# Patient Record
Sex: FEMALE | Race: WHITE | NOT HISPANIC OR LATINO | Employment: UNEMPLOYED | URBAN - NONMETROPOLITAN AREA
[De-identification: names, ages, dates, MRNs, and addresses within clinical notes are randomized per-mention and may not be internally consistent; named-entity substitution may affect disease eponyms.]

---

## 2020-10-28 NOTE — PROGRESS NOTES
CM met with the patient to discuss discharge planning, patient sitting in the recliner. Patient stated that she feels that she is making progress and would like to go home soon. CM discussed a tentative discharge Wednesday 7/29, patient verbalized that would be a reasonable plan. CM spoke with the patient regarding [facility] 80 (PT/OT), patient stated that she is not sure if she feels that is needed but will discuss it with her  who will be visiting her later this evening. CM will follow-up with the patient tomorrow to finalize plans. Patient: Enedina Yeboah    YOB: 1970    Date: 10/29/2020    Primary Care Provider: Yue Mario PA    Chief Complaint   Patient presents with   • Mass     mass x2 on the right hip       SUBJECTIVE:    History of present illness:  The patient is in the office for evaluation and treatment of 2 masses on her right hip.  She states that they are causing pain when she walks or performed normal daily routines.  Patient also has significant arthritis in her right hip, not likely to have the lipoma causing the pain in the hip.  Lipoma present for many years, no significant change in size.  She feels 2 lesion on the right lateral hip.    The following portions of the patient's history were reviewed and updated as appropriate: allergies, current medications, past family history, past medical history, past social history, past surgical history and problem list.      Review of Systems   Constitutional: Negative for chills, fever and unexpected weight change.   HENT: Negative for hearing loss, trouble swallowing and voice change.    Eyes: Negative for visual disturbance.   Respiratory: Negative for apnea, cough, chest tightness, shortness of breath and wheezing.    Cardiovascular: Negative for chest pain, palpitations and leg swelling.   Gastrointestinal: Negative for abdominal distention, abdominal pain, anal bleeding, blood in stool, constipation, diarrhea, nausea, rectal pain and vomiting.   Endocrine: Negative for cold intolerance and heat intolerance.   Genitourinary: Negative for difficulty urinating, dysuria and flank pain.   Musculoskeletal: Negative for back pain and gait problem.   Skin: Negative for color change, rash and wound.   Neurological: Negative for dizziness, syncope, speech difficulty, weakness, light-headedness, numbness and headaches.   Hematological: Negative for adenopathy. Does not bruise/bleed easily.   Psychiatric/Behavioral: Negative for confusion. The patient is not  "nervous/anxious.        Allergies:  Allergies   Allergen Reactions   • Sumatriptan Headache       Medications:    Current Outpatient Medications:   •  acetaminophen (TYLENOL) 325 MG tablet, , Disp: , Rfl:   •  Adalimumab (Humira Pen) 40 MG/0.8ML Pen-injector Kit, Humira Pen 40 mg/0.8 mL subcutaneous kit, Disp: , Rfl:   •  citalopram (CeleXA) 10 MG tablet, , Disp: , Rfl:   •  Cosentyx Sensoready, 300 MG, 150 MG/ML solution auto-injector, , Disp: , Rfl:   •  folic acid (FOLVITE) 1 MG tablet, folic acid 1 mg tablet, Disp: , Rfl:   •  levothyroxine (SYNTHROID, LEVOTHROID) 200 MCG tablet, , Disp: , Rfl:   •  meloxicam (MOBIC) 15 MG tablet, , Disp: , Rfl:   •  omeprazole (priLOSEC) 40 MG capsule, , Disp: , Rfl:     History:  History reviewed. No pertinent past medical history.    Past Surgical History:   Procedure Laterality Date   •  SECTION     • THYROID LOBECTOMY         Family History   Problem Relation Age of Onset   • Hypertension Mother    • Diabetes Mother    • COPD Mother    • Heart attack Father        Social History     Tobacco Use   • Smoking status: Never Smoker   • Smokeless tobacco: Never Used   Substance Use Topics   • Alcohol use: Never     Frequency: Never   • Drug use: Never        OBJECTIVE:    Vital Signs:   Vitals:    10/29/20 0923   BP: 135/49   Pulse: 64   Temp: 98.2 °F (36.8 °C)   TempSrc: Temporal   SpO2: 97%   Weight: (!) 146 kg (322 lb 6.4 oz)   Height: 162.6 cm (64\")       Physical Exam:   General Appearance:    Alert, cooperative, in no acute distress   Head:    Normocephalic, without obvious abnormality, atraumatic   Eyes:            Lids and lashes normal, conjunctivae and sclerae normal, no   icterus, no pallor, corneas clear, PERRLA   Ears:    Ears appear intact with no abnormalities noted   Throat:   No oral lesions, no thrush, oral mucosa moist   Neck:   No adenopathy, supple, trachea midline, no thyromegaly, no   carotid bruit, no JVD   Lungs:     Clear to " auscultation,respirations regular, even and                  unlabored    Heart:    Regular rhythm and normal rate, normal S1 and S2, no            murmur, no gallop, no rub, no click   Chest Wall:    No abnormalities observed   Abdomen:     Normal bowel sounds, no masses, no organomegaly, soft        non-tender, non-distended, no guarding, no rebound                tenderness   Extremities:   Moves all extremities well, no edema, no cyanosis, no             Redness, 6 cm lipoma right lateral hip, second lipoma just anterior measures 3 cm.   Pulses:   Pulses palpable and equal bilaterally   Skin:   No bleeding, bruising or rash   Lymph nodes:   No palpable adenopathy   Neurologic:   Cranial nerves 2 - 12 grossly intact, sensation intact, DTR       present and equal bilaterally     Results Review:   I reviewed the patient's new clinical results.    Review of Systems was reviewed and confirmed as accurate as documented by the MA.    ASSESSMENT/PLAN:    1. Lipoma of right lower extremity        Patient told to get evaluated by orthopedics for possible right hip discomfort.  Very unusual for lipoma to cause pain with walking and moving.  If hip is cleared then she can follow-up with me for excision of the lipomas.    I discussed the patients findings and my recommendations with patient        Electronically signed by Lonnie Morales MD  10/29/20    Portions of this note have been scribed for Lonnie Morales MD by Michelle Milligan. 10/29/2020  10:41 EDT

## 2020-10-29 ENCOUNTER — OFFICE VISIT (OUTPATIENT)
Dept: SURGERY | Facility: CLINIC | Age: 50
End: 2020-10-29

## 2020-10-29 VITALS
WEIGHT: 293 LBS | OXYGEN SATURATION: 97 % | TEMPERATURE: 98.2 F | BODY MASS INDEX: 50.02 KG/M2 | HEART RATE: 64 BPM | SYSTOLIC BLOOD PRESSURE: 135 MMHG | HEIGHT: 64 IN | DIASTOLIC BLOOD PRESSURE: 49 MMHG

## 2020-10-29 DIAGNOSIS — D17.23 LIPOMA OF RIGHT LOWER EXTREMITY: Primary | ICD-10-CM

## 2020-10-29 PROCEDURE — 99203 OFFICE O/P NEW LOW 30 MIN: CPT | Performed by: SURGERY

## 2020-10-29 RX ORDER — CITALOPRAM 10 MG/1
TABLET ORAL
COMMUNITY
Start: 2020-08-25 | End: 2021-11-05

## 2020-10-29 RX ORDER — MELOXICAM 15 MG/1
TABLET ORAL
COMMUNITY
Start: 2020-08-03 | End: 2021-03-16

## 2020-10-29 RX ORDER — SECUKINUMAB 150 MG/ML
INJECTION SUBCUTANEOUS
COMMUNITY
Start: 2020-10-27 | End: 2021-11-05

## 2020-10-29 RX ORDER — ACETAMINOPHEN 325 MG/1
TABLET ORAL
COMMUNITY
Start: 2020-07-24 | End: 2021-03-16

## 2020-10-29 RX ORDER — LEVOTHYROXINE SODIUM 0.2 MG/1
TABLET ORAL
COMMUNITY
Start: 2020-09-09 | End: 2021-03-16 | Stop reason: SDUPTHER

## 2020-10-29 RX ORDER — ADALIMUMAB 40MG/0.8ML
KIT SUBCUTANEOUS
COMMUNITY
End: 2021-03-16 | Stop reason: ALTCHOICE

## 2020-10-29 RX ORDER — OMEPRAZOLE 40 MG/1
CAPSULE, DELAYED RELEASE ORAL
COMMUNITY
Start: 2020-08-25 | End: 2021-03-16

## 2020-10-29 RX ORDER — FOLIC ACID 1 MG/1
TABLET ORAL
COMMUNITY
End: 2021-11-05

## 2021-03-16 ENCOUNTER — OFFICE VISIT (OUTPATIENT)
Dept: ORTHOPEDIC SURGERY | Facility: CLINIC | Age: 51
End: 2021-03-16

## 2021-03-16 VITALS — HEIGHT: 64 IN | WEIGHT: 293 LBS | BODY MASS INDEX: 50.02 KG/M2 | TEMPERATURE: 97.1 F | RESPIRATION RATE: 18 BRPM

## 2021-03-16 DIAGNOSIS — M16.11 PRIMARY OSTEOARTHRITIS OF RIGHT HIP: ICD-10-CM

## 2021-03-16 DIAGNOSIS — E66.01 OBESITY, MORBID, BMI 50 OR HIGHER (HCC): ICD-10-CM

## 2021-03-16 DIAGNOSIS — M54.50 LUMBAR PAIN WITH RADIATION DOWN RIGHT LEG: ICD-10-CM

## 2021-03-16 DIAGNOSIS — M25.551 ARTHRALGIA OF RIGHT HIP: Primary | ICD-10-CM

## 2021-03-16 DIAGNOSIS — M79.604 LUMBAR PAIN WITH RADIATION DOWN RIGHT LEG: ICD-10-CM

## 2021-03-16 PROCEDURE — 99203 OFFICE O/P NEW LOW 30 MIN: CPT | Performed by: PHYSICIAN ASSISTANT

## 2021-03-16 RX ORDER — LEVOTHYROXINE SODIUM 0.07 MG/1
50 TABLET ORAL DAILY
COMMUNITY
End: 2021-11-05

## 2021-03-16 RX ORDER — METHOTREXATE 2.5 MG/1
TABLET ORAL
COMMUNITY
Start: 2020-12-14 | End: 2021-11-05

## 2021-03-16 RX ORDER — ACETAMINOPHEN 500 MG/1
500 TABLET ORAL EVERY 4 HOURS PRN
COMMUNITY
Start: 2021-03-05

## 2021-03-16 RX ORDER — OMEPRAZOLE 40 MG/1
40 CAPSULE, DELAYED RELEASE ORAL DAILY
COMMUNITY

## 2021-03-16 RX ORDER — IBUPROFEN 800 MG/1
800 TABLET ORAL EVERY 8 HOURS PRN
COMMUNITY
Start: 2021-03-05

## 2021-03-16 RX ORDER — LEVOTHYROXINE SODIUM 0.03 MG/1
TABLET ORAL
COMMUNITY
Start: 2021-03-10 | End: 2021-11-05 | Stop reason: DRUGHIGH

## 2021-03-16 NOTE — PROGRESS NOTES
Subjective   Patient ID: Enedina Yeboah is a 50 y.o. right hand dominant female  Pain of the Right Hip (Referred by Dr Cassidy, no tx other than muscle relaxer and anti-inflammatories also reports some back pain, had PT for back 2018, no other treatment)         History of Present Illness  Patient presents as a new patient with complaints of right posterior hip pain that has been ongoing for several years.  She denies injury or trauma.  She has tried oral anti-inflammatories as well as right hip greater trochanter bursa injection without improvement.  Patient mentions that she has even tried physical therapy in the past without significant improvement  Pain Score: 9  Pain Location: Hip  Pain Orientation: Right     Pain Descriptors: Aching, Radiating, Throbbing, Sharp (down leg)  Pain Frequency: Intermittent  Pain Onset: Progressive     Clinical Progression: Gradually worsening  Aggravating Factors: Standing, Walking        Pain Intervention(s): Medication (See MAR), Rest, Heat applied  Result of Injury: No       Past Medical History:   Diagnosis Date   • Anxiety    • GERD (gastroesophageal reflux disease)    • Hip arthrosis         Past Surgical History:   Procedure Laterality Date   •  SECTION     • ENDOMETRIAL ABLATION     • THYROID LOBECTOMY         Family History   Problem Relation Age of Onset   • Hypertension Mother    • Diabetes Mother    • COPD Mother    • Heart attack Father        Social History     Socioeconomic History   • Marital status: Unknown     Spouse name: Not on file   • Number of children: Not on file   • Years of education: Not on file   • Highest education level: Not on file   Tobacco Use   • Smoking status: Never Smoker   • Smokeless tobacco: Never Used   Vaping Use   • Vaping Use: Never used   Substance and Sexual Activity   • Alcohol use: Never   • Drug use: Never   • Sexual activity: Defer         Current Outpatient Medications:   •  levothyroxine (SYNTHROID, LEVOTHROID) 75 MCG  "tablet, Take 75 mcg by mouth Daily., Disp: , Rfl:   •  omeprazole (priLOSEC) 40 MG capsule, Take 40 mg by mouth Daily., Disp: , Rfl:   •  citalopram (CeleXA) 10 MG tablet, , Disp: , Rfl:   •  Cosentyx Sensoready, 300 MG, 150 MG/ML solution auto-injector, , Disp: , Rfl:   •  folic acid (FOLVITE) 1 MG tablet, folic acid 1 mg tablet, Disp: , Rfl:   •  HM Pain Relief Extra Strength 500 MG tablet, , Disp: , Rfl:   •  ibuprofen (ADVIL,MOTRIN) 800 MG tablet, , Disp: , Rfl:   •  levothyroxine (SYNTHROID, LEVOTHROID) 25 MCG tablet, , Disp: , Rfl:   •  methotrexate 2.5 MG tablet, , Disp: , Rfl:     Allergies   Allergen Reactions   • Sumatriptan Headache       Review of Systems   Constitutional: Negative for diaphoresis, fever and unexpected weight change.   HENT: Negative for dental problem and sore throat.    Eyes: Negative for visual disturbance.   Respiratory: Negative for shortness of breath.    Cardiovascular: Negative for chest pain.   Gastrointestinal: Negative for abdominal pain, constipation, diarrhea, nausea and vomiting.   Genitourinary: Negative for difficulty urinating and frequency.   Musculoskeletal: Positive for arthralgias (right hip) and back pain.   Neurological: Negative for headaches.   Hematological: Does not bruise/bleed easily.       I have reviewed the medical and surgical history, family history, social history, medications, and/or allergies, and the review of systems of this report.    Objective   Temp 97.1 °F (36.2 °C) (Skin)   Resp 18   Ht 162.6 cm (64\")   Wt (!) 143 kg (316 lb)   BMI 54.24 kg/m²    Physical Exam  Vitals and nursing note reviewed.   Constitutional:       Appearance: Normal appearance.   Pulmonary:      Effort: Pulmonary effort is normal.   Musculoskeletal:      Lumbar back: Tenderness and bony tenderness present. No signs of trauma.   Neurological:      Mental Status: She is alert and oriented to person, place, and time.       Right Hip Exam     Tenderness   The patient is " experiencing tenderness in the greater trochanter, posterior, lateral and anterior.    Range of Motion   Abduction: 30   Flexion: 80     Tests   NAYLA: positive    Other   Sensation: normal  Pulse: present           Extremity DVT signs are negative on physical exam with negative Gianni sign, no calf pain, no palpable cords and no skin tone change   Neurologic Exam     Mental Status   Oriented to person, place, and time.          Negative straight leg raise    Assessment/Plan   Independent Review of Radiographic Studies:    X-ray of the right hip 2 view performed in the office for the evaluation of hip pain.  No comparison films available for review.  No acute fracture.  It does appear to be right hip degenerative changes.  X-ray lumbar spine 2 view performed in the office for the evaluation of lumbar pain.  No comparison films available reviewed.  Does appear to be multilevel degenerative changes.      Procedures       Diagnoses and all orders for this visit:    1. Arthralgia of right hip (Primary)  -     XR Hip With or Without Pelvis 2 - 3 View Right; Future  -     [facility] Large Joint    2. Lumbar pain with radiation down right leg  -     XR Spine Lumbar 2 or 3 View; Future    3. Primary osteoarthritis of right hip  -     [facility] Pain [facility] Large Joint    4. Obesity, morbid, BMI 50 or higher (CMS/[facility])  -     Ambulatory Referral to Bariatric Surgery       Orthopedic activities reviewed and patient expressed appreciation  Discussion of orthopedic goals  Risk, benefits, and merits of treatment alternatives reviewed with the patient and questions answered  Ice, heat, and/or modalities as beneficial    Recommendations/Plan:  Exercise, medications, injections, other patient advice, and return appointment as noted.  Patient is encouraged to call or return for any issues or concerns.  I had a lengthy discussion with the patient about lowering her BMI in order to be an ideal surgical candidate in the  future.  We discussed the need to have a BMI below 45.  We discussed proper diet and exercise tips to help reduce weight.  She would be interested in a referral to bariatric surgery.    Follow-up in 3 months  Patient agreeable to call or return sooner for any concerns.               EMR Dragon-transcription disclaimer:  This encounter note is an electronic transcription of spoken language to printed text.  Electronic transcription of spoken language may permit erroneous or at times nonsensical words or phrases to be inadvertently transcribed.  Although I have reviewed the note for such errors, some may still exist

## 2021-04-16 ENCOUNTER — HOSPITAL ENCOUNTER (OUTPATIENT)
Dept: GENERAL RADIOLOGY | Facility: HOSPITAL | Age: 51
Discharge: HOME | End: 2021-04-16
Admitting: ORTHOPAEDIC SURGERY

## 2021-04-16 PROCEDURE — 25010000003 LIDOCAINE 1 % SOLUTION: Performed by: PHYSICIAN ASSISTANT

## 2021-04-16 PROCEDURE — 25010000002 METHYLPREDNISOLONE PER 80 MG: Performed by: PHYSICIAN ASSISTANT

## 2021-04-16 PROCEDURE — 20610 DRAIN/INJ JOINT/BURSA W/O US: CPT | Performed by: ORTHOPAEDIC SURGERY

## 2021-04-16 PROCEDURE — 77002 NEEDLE LOCALIZATION BY XRAY: CPT

## 2021-04-16 PROCEDURE — 77002 NEEDLE LOCALIZATION BY XRAY: CPT | Performed by: ORTHOPAEDIC SURGERY

## 2021-04-16 RX ORDER — LIDOCAINE HYDROCHLORIDE 10 MG/ML
5 INJECTION, SOLUTION INFILTRATION; PERINEURAL ONCE
Status: COMPLETED | OUTPATIENT
Start: 2021-04-16 | End: 2021-04-16

## 2021-04-16 RX ORDER — METHYLPREDNISOLONE ACETATE 80 MG/ML
80 INJECTION, SUSPENSION INTRA-ARTICULAR; INTRALESIONAL; INTRAMUSCULAR; SOFT TISSUE ONCE
Status: COMPLETED | OUTPATIENT
Start: 2021-04-16 | End: 2021-04-16

## 2021-04-16 RX ADMIN — LIDOCAINE HYDROCHLORIDE 5 ML: 10 INJECTION, SOLUTION INFILTRATION; PERINEURAL at 14:00

## 2021-04-16 RX ADMIN — METHYLPREDNISOLONE ACETATE 80 MG: 80 INJECTION, SUSPENSION INTRA-ARTICULAR; INTRALESIONAL; INTRAMUSCULAR; SOFT TISSUE at 14:01

## 2021-04-16 NOTE — POST-PROCEDURE NOTE
[facility]  [address], [address]  [place] [address]  [phone]        PROCEDURE REPORT        DIAGNOSIS:  Right hip osteoarthritis, symptomatic    PROCEDURE: Right  hip injection under flouroscopy      Enedina Yeboah with date of birth 1970 presents to [facility] Radiology Department today for injection therapy.        Patient presents to [facility] Radiology Department Flouroscopy Suite on 4/16/2021 for planned elective right hip injection under flouroscopy for symptomatic osteoarthritis.    Procedure:     After consent was obtained, and using ethyl chloride topical local anesthetic, the right hip was then prepped and draped with sterile technique. With an anterior hip approach, flouroscopy guidance, and care to stay lateral of the femoral artery, the hip joint was entered via a 20 gauge spinal needle.  A mixture of 80 mg methylprednisolone in one ml plus 5 ml of 1% plain Lidocaine was injected and the needle withdrawn. The procedure was well tolerated and without complication. The patient noted relief of focal hip joint pain.  The patient did remain stable and with baseline ambulation. The patient is asked to rest the joint for a few more days before resuming full regular activities. It may be painful for the first few days. Watch for fever, skin issues, increased swelling or persistent pain in the joint. Call or return to clinic if such symptoms occur, other concerns or if there is lack of improvement as anticipated.    Impression: Symptomatic right hip osteoarthritis.      Recommendations/Plan:      Treatment and patient advice as noted here and in office visit report.  Orthopedic activities reviewed and patient expressed appreciation.  Discussion of orthopedic goals.   Risk, benefits, and merits of treatment options reviewed and questions answered.  Call or notify for any adverse effect from injection therapy.    Exercise: As tolerated.  No strenuous  activity for a few days as appropriate.  Brace:  No brace was given at today's visit  Referral: No referrals made at today's visit  Studies: No additional studies ordered.  Surgery: No surgery proposed at this visit.  Activity:  May perform usual activities as tolerated.      Patient will return to our clinic at scheduled appointment.  Patient agreeable to call or return sooner for any concerns.

## 2021-09-15 ENCOUNTER — TRANSCRIBE ORDERS (OUTPATIENT)
Dept: ULTRASOUND IMAGING | Facility: HOSPITAL | Age: 51
End: 2021-09-15

## 2021-09-15 ENCOUNTER — HOSPITAL ENCOUNTER (OUTPATIENT)
Dept: ULTRASOUND IMAGING | Facility: HOSPITAL | Age: 51
Discharge: HOME | End: 2021-09-15
Admitting: ORTHOPAEDIC SURGERY

## 2021-09-15 DIAGNOSIS — M25.462 SWELLING OF LEFT KNEE JOINT: ICD-10-CM

## 2021-09-15 DIAGNOSIS — M25.562 LEFT KNEE PAIN, UNSPECIFIED CHRONICITY: Primary | ICD-10-CM

## 2021-09-15 DIAGNOSIS — M25.562 LEFT KNEE PAIN, UNSPECIFIED CHRONICITY: ICD-10-CM

## 2021-09-15 PROCEDURE — 93971 EXTREMITY STUDY: CPT

## 2021-10-29 ENCOUNTER — CONSULT (OUTPATIENT)
Dept: ONCOLOGY | Facility: CLINIC | Age: 51
End: 2021-10-29

## 2021-10-29 ENCOUNTER — LAB (OUTPATIENT)
Dept: LAB | Facility: HOSPITAL | Age: 51
End: 2021-10-29

## 2021-10-29 VITALS
HEART RATE: 86 BPM | TEMPERATURE: 96.9 F | HEIGHT: 64 IN | RESPIRATION RATE: 16 BRPM | OXYGEN SATURATION: 98 % | BODY MASS INDEX: 47.94 KG/M2 | DIASTOLIC BLOOD PRESSURE: 89 MMHG | WEIGHT: 280.8 LBS | SYSTOLIC BLOOD PRESSURE: 127 MMHG

## 2021-10-29 DIAGNOSIS — C22.1 CHOLANGIOCARCINOMA (HCC): ICD-10-CM

## 2021-10-29 DIAGNOSIS — C22.1 CHOLANGIOCARCINOMA (HCC): Primary | ICD-10-CM

## 2021-10-29 LAB
ALBUMIN SERPL-MCNC: 3.2 G/DL (ref 3.5–5.2)
ALBUMIN/GLOB SERPL: 0.8 G/DL
ALP SERPL-CCNC: 397 U/L (ref 39–117)
ALT SERPL W P-5'-P-CCNC: 38 U/L (ref 1–33)
ANION GAP SERPL CALCULATED.3IONS-SCNC: 10 MMOL/L (ref 5–15)
AST SERPL-CCNC: 52 U/L (ref 1–32)
BASOPHILS # BLD AUTO: 0.04 10*3/MM3 (ref 0–0.2)
BASOPHILS NFR BLD AUTO: 0.4 % (ref 0–1.5)
BILIRUB SERPL-MCNC: 5.3 MG/DL (ref 0–1.2)
BUN SERPL-MCNC: 8 MG/DL (ref 6–20)
BUN/CREAT SERPL: 11.8 (ref 7–25)
CALCIUM SPEC-SCNC: 9.2 MG/DL (ref 8.6–10.5)
CHLORIDE SERPL-SCNC: 96 MMOL/L (ref 98–107)
CO2 SERPL-SCNC: 27 MMOL/L (ref 22–29)
CREAT SERPL-MCNC: 0.68 MG/DL (ref 0.57–1)
DEPRECATED RDW RBC AUTO: 53.1 FL (ref 37–54)
EOSINOPHIL # BLD AUTO: 0.06 10*3/MM3 (ref 0–0.4)
EOSINOPHIL NFR BLD AUTO: 0.6 % (ref 0.3–6.2)
ERYTHROCYTE [DISTWIDTH] IN BLOOD BY AUTOMATED COUNT: 15.7 % (ref 12.3–15.4)
GFR SERPL CREATININE-BSD FRML MDRD: 92 ML/MIN/1.73
GLOBULIN UR ELPH-MCNC: 4.2 GM/DL
GLUCOSE SERPL-MCNC: 132 MG/DL (ref 65–99)
HCG INTACT+B SERPL-ACNC: 1.01 MIU/ML
HCT VFR BLD AUTO: 43.4 % (ref 34–46.6)
HGB BLD-MCNC: 14.4 G/DL (ref 12–15.9)
IMM GRANULOCYTES # BLD AUTO: 0.07 10*3/MM3 (ref 0–0.05)
IMM GRANULOCYTES NFR BLD AUTO: 0.8 % (ref 0–0.5)
LYMPHOCYTES # BLD AUTO: 1.4 10*3/MM3 (ref 0.7–3.1)
LYMPHOCYTES NFR BLD AUTO: 15.1 % (ref 19.6–45.3)
MCH RBC QN AUTO: 30.3 PG (ref 26.6–33)
MCHC RBC AUTO-ENTMCNC: 33.2 G/DL (ref 31.5–35.7)
MCV RBC AUTO: 91.4 FL (ref 79–97)
MONOCYTES # BLD AUTO: 0.99 10*3/MM3 (ref 0.1–0.9)
MONOCYTES NFR BLD AUTO: 10.7 % (ref 5–12)
NEUTROPHILS NFR BLD AUTO: 6.69 10*3/MM3 (ref 1.7–7)
NEUTROPHILS NFR BLD AUTO: 72.4 % (ref 42.7–76)
NRBC BLD AUTO-RTO: 0 /100 WBC (ref 0–0.2)
PLATELET # BLD AUTO: 341 10*3/MM3 (ref 140–450)
PMV BLD AUTO: 9.9 FL (ref 6–12)
POTASSIUM SERPL-SCNC: 4 MMOL/L (ref 3.5–5.2)
PROT SERPL-MCNC: 7.4 G/DL (ref 6–8.5)
RBC # BLD AUTO: 4.75 10*6/MM3 (ref 3.77–5.28)
SODIUM SERPL-SCNC: 133 MMOL/L (ref 136–145)
WBC # BLD AUTO: 9.25 10*3/MM3 (ref 3.4–10.8)

## 2021-10-29 PROCEDURE — 85025 COMPLETE CBC W/AUTO DIFF WBC: CPT

## 2021-10-29 PROCEDURE — 80053 COMPREHEN METABOLIC PANEL: CPT

## 2021-10-29 PROCEDURE — 84702 CHORIONIC GONADOTROPIN TEST: CPT

## 2021-10-29 PROCEDURE — 99205 OFFICE O/P NEW HI 60 MIN: CPT | Performed by: INTERNAL MEDICINE

## 2021-10-29 PROCEDURE — 36415 COLL VENOUS BLD VENIPUNCTURE: CPT

## 2021-10-29 RX ORDER — SODIUM CHLORIDE 9 MG/ML
250 INJECTION, SOLUTION INTRAVENOUS ONCE
Status: CANCELLED | OUTPATIENT
Start: 2021-11-05

## 2021-10-29 RX ORDER — PACLITAXEL 100 MG/20ML
125 INJECTION, POWDER, LYOPHILIZED, FOR SUSPENSION INTRAVENOUS ONCE
Status: CANCELLED | OUTPATIENT
Start: 2021-11-05

## 2021-10-29 RX ORDER — PACLITAXEL 100 MG/20ML
125 INJECTION, POWDER, LYOPHILIZED, FOR SUSPENSION INTRAVENOUS ONCE
Status: CANCELLED | OUTPATIENT
Start: 2021-11-19

## 2021-10-29 RX ORDER — SODIUM CHLORIDE 9 MG/ML
250 INJECTION, SOLUTION INTRAVENOUS ONCE
Status: CANCELLED | OUTPATIENT
Start: 2021-11-19

## 2021-10-29 NOTE — PROGRESS NOTES
ID: 50 y.o. year old female from [facility] [id number]    PCP: Yue Mario PA    REFERRING PHYSICIAN: Dr. Ben Mcnally    Reason for Consultation: Metastatic cholangiocarcinoma    Dear Dr. Mcnally and Ms. Mario    It is a pleasure to meet Ms. Yeboah today.  She is a very pleasant 50-year-old lady who presents today for consultation due to a recent diagnosis of a metastatic cholangiocarcinoma.  She presented with obstructive jaundice with a bilirubin of 10.  Subsequently she underwent stent placement and also a biopsy.  Her bilirubin today is 5.  She reports she has not been feeling well for the last 6 months or so.  She has lost some weight.  Appetite is poor.  She has underlying history of psoriatic arthritis.  She denies any issues with fevers.  No issues with nausea vomiting.  No diarrhea.      Past Medical History:   Diagnosis Date   • Anxiety    • Cholangiocarcinoma (HCC) 10/29/2021   • GERD (gastroesophageal reflux disease)    • Hip arthrosis    • Psoriatic arthritis (HCC)        Past Surgical History:   Procedure Laterality Date   •  SECTION      , ,    • ENDOMETRIAL ABLATION     • LIVER SURGERY  10/26/2021    stents   • THYROID LOBECTOMY  2019    partial       Social History     Socioeconomic History   • Marital status: Unknown   Tobacco Use   • Smoking status: Former Smoker     Packs/day: 0.25     Quit date: 2018     Years since quitting: 3.8   • Smokeless tobacco: Never Used   Vaping Use   • Vaping Use: Never used   Substance and Sexual Activity   • Alcohol use: Never   • Drug use: Never   • Sexual activity: Defer       Family History   Problem Relation Age of Onset   • Hypertension Mother    • Diabetes Mother    • COPD Mother    • Heart disease Mother    • Heart attack Father        Review of Systems:    16 point review of systems was performed and reviewed and scanned into the EMR    Review of Systems - Oncology      Current Outpatient Medications:   •  citalopram (CeleXA)  10 MG tablet, , Disp: , Rfl:   •  Cosentyx Sensoready, 300 MG, 150 MG/ML solution auto-injector, , Disp: , Rfl:   •  HM Pain Relief Extra Strength 500 MG tablet, , Disp: , Rfl:   •  ibuprofen (ADVIL,MOTRIN) 800 MG tablet, , Disp: , Rfl:   •  levothyroxine (SYNTHROID, LEVOTHROID) 75 MCG tablet, Take 50 mcg by mouth Daily. Patient taking 50mcg, Disp: , Rfl:   •  omeprazole (priLOSEC) 40 MG capsule, Take 40 mg by mouth Daily., Disp: , Rfl:   •  folic acid (FOLVITE) 1 MG tablet, folic acid 1 mg tablet, Disp: , Rfl:   •  levothyroxine (SYNTHROID, LEVOTHROID) 25 MCG tablet, , Disp: , Rfl:   •  methotrexate 2.5 MG tablet, , Disp: , Rfl:     Pain Medications             citalopram (CeleXA) 10 MG tablet     HM Pain Relief Extra Strength 500 MG tablet     ibuprofen (ADVIL,MOTRIN) 800 MG tablet            Allergies   Allergen Reactions   • Sumatriptan Headache         ECOG score: 2           Objective     Vitals:    10/29/21 0934   BP: 127/89   Pulse: 86   Resp: 16   Temp: 96.9 °F (36.1 °C)   SpO2: 98%     Body mass index is 48.2 kg/m².  Body surface area is 2.26 meters squared.        10/29/21  0934   Weight: 127 kg (280 lb 12.8 oz)     Pain Score    10/29/21 0934   PainSc:   7   PainLoc: Back          Physical Exam    General: well appearing, in no acute distress, morbid obesity  HEENT: sclera Icteric,  neck is supple  Lymphatics: no cervical, supraclavicular, or axillary adenopathy  Cardiovascular: regular rate and rhythm, no murmurs, rubs or gallops  Lungs: clear to auscultation bilaterally  Abdomen: soft, nontender, nondistended.  No palpable organomegaly  Extremities: no lower extremity edema  Skin: no rashes, lesions, bruising, or petechiae  Msk:  Shows no weakness of the large muscle groups  Psych: Mood is stable        Lab Results   Component Value Date    GLUCOSE 132 (H) 10/29/2021    BUN 8 10/29/2021    CREATININE 0.68 10/29/2021     (L) 10/29/2021    K 4.0 10/29/2021    CL 96 (L) 10/29/2021    CO2 27.0  10/29/2021    CALCIUM 9.2 10/29/2021    PROTEINTOT 7.4 10/29/2021    ALBUMIN 3.20 (L) 10/29/2021    BILITOT 5.3 (H) 10/29/2021    ALKPHOS 397 (H) 10/29/2021    AST 52 (H) 10/29/2021    ALT 38 (H) 10/29/2021       Lab Results   Component Value Date    HGB 14.4 10/29/2021    HCT 43.4 10/29/2021    MCV 91.4 10/29/2021     10/29/2021    WBC 9.25 10/29/2021    NEUTROABS 6.69 10/29/2021    LYMPHSABS 1.40 10/29/2021    MONOSABS 0.99 (H) 10/29/2021    EOSABS 0.06 10/29/2021    BASOSABS 0.04 10/29/2021     Component 3 d ago   Case Report  Surgical Pathology                                Case: [id number]                                   Authorizing Provider:  Montrell Karimi MD       Collected:           10/26/2021 0952               Ordering Location:     [facility]            Received:            10/26/2021 1124               Pathologist:           Renata Gerard MD                                                             Specimens:   A) - Other (specify site), left hepatic duct stricture                                              B) - Stomach, gastric biopsy                                                            Final Diagnosis     A. LEFT HEPATIC DUCT STRICTURE, BIOPSY:      -  POORLY DIFFERENTIATED ADENOCARCINOMA.     B. STOMACH, BIOPSY:      -  NO PATHOLOGIC ABNORMALITY.   Electronically signed by Renata Gerard MD on 10/28/2021 at  2:06 PM       Impression  Performed by IMAGING  There is extensive mediastinal lymphadenopathy with numerous enlarged hypodense nodes especially involving the right paratracheal, precarinal and subcarinal regions. The low-attenuation may reflect the presence of necrosis.       There are also prominent epicardial nodes.     There are number of very small peripheral nodules bilaterally as described above. Recommend attention to these small nodules on subsequent studies. No infiltrates or effusions.       ABDOMEN:       Solid Abdominal Organs: There is a 12 mm node seen  just to the left of the IVC-image 29, series 4. There is a fairly poorly defined faintly hypodense mass involving the medial aspect of the liver near the confluence of the hepatic veins-for example image 34, series 5. This measures approximately 3.4 x 5.4 cm. No biliary dilatation. There is radiographic contrast seen in left-sided hepatic ducts-for example image 39, series 5. There is periportal edema. There is a prominent air-fluid level within the gallbladder as well as radiographic contrast within the gallbladder suggesting vicarious excretion. A small subtle area of low-attenuation involves the anterior subcapsular segment 8-image 25, series 5. This measures 7 mm. A biliary catheter extends from the anterior segment of the right lobe of the common duct. There is also a pancreatic catheter.     There are enlarged nodes in the posterior aspect of the gastrohepatic ligament-image 47, series 5. These have short axis diameters on the order of 11 mm. There is an 18 mm portacaval node which is hypodense-image 54, series 5. There is an ill-defined hypodense periportal node-image 48, series 5. Prominent lymph nodes are seen adjacent to the proximal celiac axis-image 54, series 5. There is bulky confluent retroperitoneal lymphadenopathy-for example image 80, series 5. There are para-aortic and aortocaval nodes as well as retrocaval nodes. The largest nodes have short axis diameters of 18-25 mm. An enlarged 11 mm node seen just to the right of the right common iliac artery-image 103, series 5.     Normal spleen, adrenals. No hydronephrosis. 12 mm hypodensity involves the lower pole of left kidney, likely a cyst-image 80, series 5.     GI Tract/Mesentery/Peritoneum: No free air. There is free fluid seen in the pelvis in the region of the cul-de-sac-image 145, series 5..       No dilated loops of large or small bowel. Numerous diverticula involve the rectosigmoid. Normal appendix.     Pelvic Viscera: A 3.5 x 4.3 cm  oval-shaped hypodensity is seen contiguous with and the left of the uterus-image 137, series 5. This may represent a left adnexal cyst versus a mass.     Lymph Nodes/Vasculature: Normal aortic course and caliber.     Free Fluid:There is free pelvic fluid in the cul-de-sac.     Musculoskeletal and Body Wall:Vertebral body heights are maintained. No lytic or blastic lesions.     IMPRESSION:       Faint poorly defined mass involving the medial aspect of the liver-image 34, series 5. This is suspicious for neoplasm. A smaller hypodensity is noted superiorly and anteriorly in the right lobe.     There is periportal, portacaval and bulky retroperitoneal lymphadenopathy. Many of the nodes appears hypodense suggesting possible necrosis.     There is a 3.5 x 4.3 cm hypodensity involving the left pelvis adjacent to the uterus. Possible left adnexal cyst. Recommend further evaluation with ultrasound. This is larger than on 10/19/2021.     Small amount of free fluid in the pelvis. This is new from 10/19/2021.     Interval placement of a biliary stent in the pancreatic stent since 10/19/2021.        Assessment/Plan      1.  Metastatic cholangiocarcinoma.  I reviewed the scans with her and also the pathology today.  We discussed the stage of her disease which is stage IV.  We discussed the expectations of her disease.  Goals of therapy is palliation and extension of life.  This is clearly not a curable disease and so chemotherapy is that to palliate her.  Since placing her stent her bilirubin has improved nicely.  Today her bilirubin is 5.  There were multiple regimens that we can consider in the setting of cholangiocarcinoma.  I chose to treat her with gemcitabine and Abraxane just due to the liver dysfunction but also to help with travel issues.  She lives about 1[place]/2 hours away.  I am going to treat her with this regimen every other week and omit day 8.  This is from the [facility] experience where they have treated pancreatic  cancers with this regimen with reasonable outcomes with less toxicity.  I am also going to send her tissue for next generation sequencing with [facility] to look for targetable mutation that we can consider in the future.  We discussed the side effects of the treatment today.  We discussed ways to control her symptoms.  She will have a port placed next week and I plan to start her on treatment the day after that.    Total time of patient care on day of service including time prior to, face to face with patient, and following visit spent in reviewing records, lab results, imaging studies, discussion with patient, and documentation/charting was > 60 minutes.        Thank you for allowing me to participate in the care of this patient.    Yours sincerely,    Ramon Lubin MD  [facility]  Hematology and Oncology    Return on: 11/19/21  Return in (Approximately): Schedule with next infusion    Orders Placed This Encounter   Procedures   • Comprehensive metabolic panel     Standing Status:   Future     Number of Occurrences:   1     Standing Expiration Date:   11/5/2022     Order Specific Question:   Release to patient     Answer:   Immediate   • hCG, Quantitative, Pregnancy     Standing Status:   Future     Number of Occurrences:   1     Standing Expiration Date:   11/5/2022     Order Specific Question:   Release to patient     Answer:   Immediate   • CBC and Differential     Standing Status:   Future     Number of Occurrences:   1     Standing Expiration Date:   11/5/2022     Order Specific Question:   Manual Differential     Answer:   No     Order Specific Question:   Release to patient     Answer:   Immediate   • CBC and Differential     Standing Status:   Future     Number of Occurrences:   1     Standing Expiration Date:   11/19/2022     Order Specific Question:   Manual Differential     Answer:   No     Order Specific Question:   Release to patient     Answer:   Immediate

## 2021-11-05 ENCOUNTER — NURSE NAVIGATOR (OUTPATIENT)
Dept: ONCOLOGY | Facility: HOSPITAL | Age: 51
End: 2021-11-05

## 2021-11-05 ENCOUNTER — OFFICE VISIT (OUTPATIENT)
Dept: ONCOLOGY | Facility: CLINIC | Age: 51
End: 2021-11-05

## 2021-11-05 ENCOUNTER — INFUSION (OUTPATIENT)
Dept: ONCOLOGY | Facility: HOSPITAL | Age: 51
End: 2021-11-05

## 2021-11-05 VITALS
HEART RATE: 51 BPM | RESPIRATION RATE: 16 BRPM | HEIGHT: 64 IN | TEMPERATURE: 96.9 F | SYSTOLIC BLOOD PRESSURE: 121 MMHG | WEIGHT: 276 LBS | BODY MASS INDEX: 47.12 KG/M2 | DIASTOLIC BLOOD PRESSURE: 79 MMHG | OXYGEN SATURATION: 98 %

## 2021-11-05 DIAGNOSIS — C22.1 CHOLANGIOCARCINOMA (HCC): Primary | ICD-10-CM

## 2021-11-05 LAB
ALBUMIN SERPL-MCNC: 3.4 G/DL (ref 3.5–5.2)
ALBUMIN/GLOB SERPL: 0.8 G/DL
ALP SERPL-CCNC: 422 U/L (ref 39–117)
ALT SERPL W P-5'-P-CCNC: 46 U/L (ref 1–33)
ANION GAP SERPL CALCULATED.3IONS-SCNC: 10.2 MMOL/L (ref 5–15)
AST SERPL-CCNC: 85 U/L (ref 1–32)
BILIRUB SERPL-MCNC: 2.1 MG/DL (ref 0–1.2)
BUN SERPL-MCNC: 11 MG/DL (ref 6–20)
BUN/CREAT SERPL: 18 (ref 7–25)
CALCIUM SPEC-SCNC: 9.3 MG/DL (ref 8.6–10.5)
CHLORIDE SERPL-SCNC: 99 MMOL/L (ref 98–107)
CO2 SERPL-SCNC: 25.8 MMOL/L (ref 22–29)
CREAT SERPL-MCNC: 0.61 MG/DL (ref 0.57–1)
GFR SERPL CREATININE-BSD FRML MDRD: 105 ML/MIN/1.73
GLOBULIN UR ELPH-MCNC: 4.1 GM/DL
GLUCOSE SERPL-MCNC: 180 MG/DL (ref 65–99)
POTASSIUM SERPL-SCNC: 4 MMOL/L (ref 3.5–5.2)
PROT SERPL-MCNC: 7.5 G/DL (ref 6–8.5)
SODIUM SERPL-SCNC: 135 MMOL/L (ref 136–145)

## 2021-11-05 PROCEDURE — 96413 CHEMO IV INFUSION 1 HR: CPT

## 2021-11-05 PROCEDURE — 25010000002 GEMCITABINE 200 MG/5.26ML SOLUTION 5.26 ML VIAL: Performed by: INTERNAL MEDICINE

## 2021-11-05 PROCEDURE — 80053 COMPREHEN METABOLIC PANEL: CPT | Performed by: NURSE PRACTITIONER

## 2021-11-05 PROCEDURE — 96367 TX/PROPH/DG ADDL SEQ IV INF: CPT

## 2021-11-05 PROCEDURE — 25010000002 PACLITAXEL PROTEIN-BOUND PART PER 1 MG: Performed by: INTERNAL MEDICINE

## 2021-11-05 PROCEDURE — 96375 TX/PRO/DX INJ NEW DRUG ADDON: CPT

## 2021-11-05 PROCEDURE — 25010000002 HEPARIN LOCK FLUSH PER 10 UNITS

## 2021-11-05 PROCEDURE — 25010000002 DEXAMETHASONE PER 1 MG: Performed by: INTERNAL MEDICINE

## 2021-11-05 PROCEDURE — 25010000002 GEMCITABINE 1 GM/26.3ML SOLUTION 26.3 ML VIAL: Performed by: INTERNAL MEDICINE

## 2021-11-05 PROCEDURE — 99215 OFFICE O/P EST HI 40 MIN: CPT | Performed by: NURSE PRACTITIONER

## 2021-11-05 PROCEDURE — 96417 CHEMO IV INFUS EACH ADDL SEQ: CPT

## 2021-11-05 RX ORDER — SODIUM CHLORIDE 0.9 % (FLUSH) 0.9 %
10 SYRINGE (ML) INJECTION AS NEEDED
Status: CANCELLED | OUTPATIENT
Start: 2021-11-05

## 2021-11-05 RX ORDER — HEPARIN SODIUM (PORCINE) LOCK FLUSH IV SOLN 100 UNIT/ML 100 UNIT/ML
SOLUTION INTRAVENOUS
Status: COMPLETED
Start: 2021-11-05 | End: 2021-11-05

## 2021-11-05 RX ORDER — HEPARIN SODIUM (PORCINE) LOCK FLUSH IV SOLN 100 UNIT/ML 100 UNIT/ML
500 SOLUTION INTRAVENOUS AS NEEDED
Status: CANCELLED | OUTPATIENT
Start: 2021-11-05

## 2021-11-05 RX ORDER — LEVOTHYROXINE SODIUM 0.05 MG/1
50 TABLET ORAL DAILY
COMMUNITY

## 2021-11-05 RX ORDER — HEPARIN SODIUM (PORCINE) LOCK FLUSH IV SOLN 100 UNIT/ML 100 UNIT/ML
500 SOLUTION INTRAVENOUS AS NEEDED
Status: DISCONTINUED | OUTPATIENT
Start: 2021-11-05 | End: 2021-11-05 | Stop reason: HOSPADM

## 2021-11-05 RX ORDER — SODIUM CHLORIDE 0.9 % (FLUSH) 0.9 %
10 SYRINGE (ML) INJECTION AS NEEDED
Status: DISCONTINUED | OUTPATIENT
Start: 2021-11-05 | End: 2021-11-05 | Stop reason: HOSPADM

## 2021-11-05 RX ORDER — SODIUM CHLORIDE 9 MG/ML
250 INJECTION, SOLUTION INTRAVENOUS ONCE
Status: DISCONTINUED | OUTPATIENT
Start: 2021-11-05 | End: 2021-11-05 | Stop reason: HOSPADM

## 2021-11-05 RX ORDER — PACLITAXEL 100 MG/20ML
125 INJECTION, POWDER, LYOPHILIZED, FOR SUSPENSION INTRAVENOUS ONCE
Status: COMPLETED | OUTPATIENT
Start: 2021-11-05 | End: 2021-11-05

## 2021-11-05 RX ORDER — LIDOCAINE AND PRILOCAINE 25; 25 MG/G; MG/G
1 CREAM TOPICAL AS NEEDED
Qty: 30 G | Refills: 3 | Status: SHIPPED | OUTPATIENT
Start: 2021-11-05

## 2021-11-05 RX ORDER — OXYCODONE HYDROCHLORIDE 5 MG/1
5 TABLET ORAL EVERY 4 HOURS PRN
Qty: 60 TABLET | Refills: 0 | Status: SHIPPED | OUTPATIENT
Start: 2021-11-05 | End: 2021-12-03 | Stop reason: SDUPTHER

## 2021-11-05 RX ORDER — OXYCODONE HYDROCHLORIDE 5 MG/1
5 CAPSULE ORAL
COMMUNITY
End: 2021-11-05

## 2021-11-05 RX ADMIN — GEMCITABINE HYDROCHLORIDE 2250 MG: 1 INJECTION, SOLUTION INTRAVENOUS at 12:32

## 2021-11-05 RX ADMIN — SODIUM CHLORIDE, PRESERVATIVE FREE 10 ML: 5 INJECTION INTRAVENOUS at 13:14

## 2021-11-05 RX ADMIN — DEXAMETHASONE SODIUM PHOSPHATE 12 MG: 4 INJECTION, SOLUTION INTRA-ARTICULAR; INTRALESIONAL; INTRAMUSCULAR; INTRAVENOUS; SOFT TISSUE at 11:17

## 2021-11-05 RX ADMIN — PACLITAXEL 285 MG: 100 INJECTION, POWDER, LYOPHILIZED, FOR SUSPENSION INTRAVENOUS at 11:58

## 2021-11-05 RX ADMIN — HEPARIN 500 UNITS: 100 SYRINGE at 13:15

## 2021-11-05 NOTE — PROGRESS NOTES
Met with patient to introduce nurse navigator role. Pt. Denies any needs at this time.Quilt given to patient.

## 2021-11-05 NOTE — PROGRESS NOTES
CHEMOTHERAPY PREPARATION    Enedina Yeboah  [id number]  1970    Subjective   Chief Complaint: Treatment Preparation and Needs Assessment    History of present illness:  Enedina Yeboah is a 50 y.o. year old female who is here today for chemotherapy preparation and needs assessment. The patient has been diagnosed with Metastatic cholangiocarcinoma  and is scheduled to begin IV treatment with gemzar and abraxane.     Oncology History:    Oncology/Hematology History   Cholangiocarcinoma (HCC)   10/29/2021 Initial Diagnosis    Cholangiocarcinoma (HCC)     11/5/2021 -  Chemotherapy    OP PANCREATIC PACLitaxel Protein-Bound / Gemcitabine         The current medication list and allergy list were reviewed and reconciled.     Past Medical History, Past Surgical History, Social History, Family History have been reviewed and are without significant changes except as mentioned.      Review of Systems   Constitutional: Positive for activity change, appetite change and fatigue.   HENT: Negative for congestion, mouth sores, nosebleeds, rhinorrhea, trouble swallowing and voice change.    Eyes: Negative for photophobia, discharge and itching.   Respiratory: Negative for apnea, cough, choking and shortness of breath.    Cardiovascular: Negative for chest pain and leg swelling.   Gastrointestinal: Positive for abdominal pain. Negative for abdominal distention, constipation, diarrhea, nausea and vomiting.   Endocrine: Negative for cold intolerance and heat intolerance.   Genitourinary: Negative for difficulty urinating, dyspareunia, flank pain, pelvic pain and urgency.   Musculoskeletal: Positive for back pain. Negative for arthralgias, gait problem and myalgias.   Skin: Negative for color change and pallor.   Allergic/Immunologic: Negative for environmental allergies and food allergies.   Neurological: Negative for dizziness, facial asymmetry, weakness and numbness.   Hematological: Negative for adenopathy. Does not  "bruise/bleed easily.   Psychiatric/Behavioral: Negative for agitation and behavioral problems. The patient is nervous/anxious.        Objective   Physical Exam  Vital Signs: /79   Pulse 51   Temp 96.9 °F (36.1 °C) (Temporal)   Resp 16   Ht 162.6 cm (64\")   Wt 125 kg (276 lb)   SpO2 98%   BMI 47.38 kg/m²    General Appearance:  alert, cooperative, no apparent distress and appears stated age   Neurologic/Psychiatric: A&O x 3, gait steady, appropriate affect   HEENT:  Normocephalic, without obvious abnormality, mucous membranes moist   Lungs:   Clear to auscultation bilaterally; respirations regular, even, and unlabored bilaterally   Heart:  Regular rate and rhythm, no murmurs appreciated   Extremities: Normal, atraumatic; no clubbing, cyanosis, or edema    Skin: No rashes, lesions, or abnormal coloration noted     ECOG Performance Status: 1 - Symptomatic but completely ambulatory            NEEDS ASSESSMENTS    Genetics  The patient's new diagnosis and family history have been reviewed for genetic counseling needs. A genetic referral is not recommended.     Psychosocial  The patient has completed a PHQ-9 Depression Screening and the Distress Thermometer (DT) today.   PHQ-9 results show 5-9 (Mild Depression). The patient scored their distress today as 5 on a scale of 0-10 with 0 being no distress and 10 being extreme distress.   Problems marked as being an issue for her within the last week include emotional problems and physical problems.   Results were reviewed along with psychosocial resources offered by our cancer center. Our oncology social worker will be flagged for a DT score of 4 or above, and a same day call will be made for a score of 9 or 10. A mental health referral is recommended at this time. The patient is not accepting of a referral to [facility].   Copies of patient's questionnaires will be scanned into EMR for details and further reference.    Barriers to care  A barriers form was " "also completed by the patient today. We discussed services offered by our facility to help her have adequate access to care. The patient was given the name and card for our  . Based upon barriers assessment today, the patient will require a follow-up call from the  to further discuss needs.   A copy of the barriers form will also be scanned into EMR for details and further reference.     VAD Assessment  The patient and I discussed planned intervenous chemotherapy as well as other IV treatments that are often needed throughout the course of treatment. These may include, but are not limited to blood transfusions, antibiotics, and IV hydration. The patient's vasculature does not appear to be adequate for multiple peripheral IVs throughout their treatment course. Discussed risks and benefits of VADs. The patient would like to pursue Port-A-Cath insertion prior to initiation of treatment.       Advance Care Planning   The patient and I discussed advanced care planning, \"Conversations that Matter\".   This service was offered, free of charge, for development of advance directives with a certified ACP facilitator.  The patient does not have an up-to-date advanced directive. This document is not on file with our office. The patient is not interested in an appointment with one of our facilitators to create or update their advanced directives.         Palliative Care  The patient and I discussed palliative care services. Palliative care is not the same as Hospice care. This is specialized medical care for people living with serious illness with the goal of improving quality of life for the patient and their family. [facility] has partnered with [facility] to offer our patients outpatient palliative care early along with their treatment to assist in coordination of care, symptom management, pain management, and medical decision making.  Oncology criteria for palliative care referral is met " at this time. The patient is not interested in a palliative care consultation.     Additional Referral needs  Nutrition  Social work      IV CHEMOTHERAPY EDUCATION    Booklets Given: Chemotherapy and You [x]  Eating Hints [x]    Sexuality/Fertility Books []      Chemotherapy/Biotherapy Education Sheets: (list all that apply)  nausea management, acid reflux management, diarrhea management, Cancer resourse contacts information, skin and mouth care and vaccination information                                                                                                                                                                 Chemotherapy Regimen:   Treatment Plans     Name Type Plan Dates Plan Provider         Active    OP PANCREATIC PACLitaxel Protein-Bound / Gemcitabine ONCOLOGY TREATMENT  11/4/2021 - Present Ramon Lubin MD                  Booklets Given: Chemotherapy and You [x]  Eating Hints [x]    Sexuality/Fertility Books []      Chemotherapy/Biotherapy Education Sheets: (list all that apply)  nausea management, acid reflux management, diarrhea management, Cancer resourse contacts information, skin and mouth care and vaccination information                                                                                                                                                                 Chemotherapy Regimen:   Treatment Plans     Name Type Plan dates Plan Provider         Active     ONCOLOGY TREATMENT  Present                     TOPICS EDUCATION PROVIDED COMMENTS   ANEMIA:  role of RBC, cause, s/s, ways to manage, role of transfusion [x]    THROMBOCYTOPENIA:  role of platelet, cause, s/s, ways to prevent bleeding, things to avoid, when to seek help [x]    NEUTROPENIA:  role of WBC, cause, infection precautions, s/s of infection, when to call MD [x]    NUTRITION & APPETITE CHANGES:  importance of maintaining healthy diet & weight, ways to manage to improve intake, dietary consult,  exercise regimen [x]    DIARRHEA:  causes, s/s of dehydration, ways to manage, dietary changes, when to call MD [x]    CONSTIPATION:  causes, ways to manage, dietary changes, when to call MD [x]    NAUSEA & VOMITING:  cause, use of antiemetics, dietary changes, when to call MD [x]    MOUTH SORES:  causes, oral care, ways to manage [x]    ALOPECIA:  cause, ways to manage, resources [x]    INFERTILITY & SEXUALITY:  causes, fertility preservation options, sexuality changes, ways to manage, importance of birth control [x]    NERVOUS SYSTEM CHANGES:  causes, s/s, neuropathies, cognitive changes, ways to manage [x]    PAIN:  causes, ways to manage [x] ????   SKIN & NAIL CHANGES:  cause, s/s, ways to manage [x]    ORGAN TOXICITIES:  cause, s/s, need for diagnostic tests, labs, when to notify MD [x]    SURVIVORSHIP:  distress, distress assessment, secondary malignancies, early/late effects, follow-up, social issues, social support [x]    HOME CARE:  use of spill kits, how to manage bodily fluids [x]    MISCELLANEOUS:  drug interactions, administration, vesicant, et [x]            Assessment and Plan:    Diagnoses and all orders for this visit:    1. Cholangiocarcinoma (HCC) (Primary)    Other orders  -     lidocaine-prilocaine (EMLA) 2.5-2.5 % cream; Apply 1 application topically to the appropriate area as directed As Needed (prior to port access).  Dispense: 30 g; Refill: 3        This was a 60 minute face-to-face visit with 50 minutes spent in  counseling and coordination of care as documented above.   The patient and I have reviewed their new cancer diagnosis and scheduled treatment plan. Needs assessment was completed including genetics, psychosocial needs, barriers to care, VAD evaluation, advanced care planning, and palliative care services. Referrals have been ordered as appropriate based upon our evaluation and patient desires.     IV and oral chemotherapy teaching was also completed today as documented above.  Adequate time was given to answer all questions to her satisfaction. Patient and family are aware of their care team members and contact information if they have questions or problems throughout the treatment course. Needs assessments and education has been completed. The patient is adequately prepared to begin treatment as scheduled.     Zofran was sent previously to the patient's pharmacy.  We discussed that she will take 1 pill by mouth every 8 hours as needed for nausea.  I will also send in EMLA cream today.  We discussed how to use for her port access.    I will refer to social work to discuss inability to pay bills, transportation issues, and medical equipment needs.  We also discussed that she may have more information for her for disability.  Nutrition will see her today with her first treatment.  Declines referral to financial counseling as well as psychiatric nurse practitioner.  She will notify us if she feels the need for those referrals.    Patient reports abdominal and back pain. Pain is a 2/10 while on oxycodone. She took her last tablet today. We will refill today.  We will do 5 mg every 6 hours as needed for pain due to metastatic cholangiocarcinoma.  I discussed with the patient that if this is not working she will notify the office.    Patient will follow-up as scheduled.  We discussed that her treatment plan will consist of day 1 and day 15 every 3 weeks.      Electronically signed by ANGELIQUE Kelley on 11/05/21 at 09:49 EDT.

## 2021-11-08 ENCOUNTER — DOCUMENTATION (OUTPATIENT)
Dept: SOCIAL WORK | Facility: HOSPITAL | Age: 51
End: 2021-11-08

## 2021-11-08 NOTE — PROGRESS NOTES
Case Management/Social Work    Patient Name:  Enedina Yeboah  YOB: 1970  MRN: [id number]  Admit Date:  (Not on file)    SW [facility] distress score of 5. SW contacted pt via telephone to discuss needed resources. Pt stated that paying her water and electric bills are her biggest concern at this time. Pt stated that she has started her disability application. SW provided pt information on [facility] [employer] for utility assistance. SW also provided pt with information on applying for disability through the social security office. Pt asked that SW email her the information on utility assistance and disability. SW informed pt of cab vouchers and gas cards if ever needed for transportation. Pt did state that she owns her own vehicle and could use gas cards. SW told pt to asked for gas cards during her next visit if needed. No other SW needs identified at this time. CM to continue to follow and assist a needed.      Electronically signed by:  KATH Cardoza  11/08/21 14:17 EST

## 2021-11-18 ENCOUNTER — TELEPHONE (OUTPATIENT)
Dept: ONCOLOGY | Facility: CLINIC | Age: 51
End: 2021-11-18

## 2021-11-18 NOTE — TELEPHONE ENCOUNTER
PATIENT CALLED WANTED TO KNOW IF HER INSURANCE WOULD COVER HER CHEMO TREATMENTS.    DOUBLE CHECKED WITH INS AND BILLING DEPT AND ADVISED PATIENT TO CALL HER INSURANCE COMPANY

## 2021-11-19 ENCOUNTER — OFFICE VISIT (OUTPATIENT)
Dept: ONCOLOGY | Facility: CLINIC | Age: 51
End: 2021-11-19

## 2021-11-19 ENCOUNTER — INFUSION (OUTPATIENT)
Dept: ONCOLOGY | Facility: HOSPITAL | Age: 51
End: 2021-11-19

## 2021-11-19 ENCOUNTER — NURSE NAVIGATOR (OUTPATIENT)
Dept: ONCOLOGY | Facility: HOSPITAL | Age: 51
End: 2021-11-19

## 2021-11-19 VITALS
BODY MASS INDEX: 46.64 KG/M2 | TEMPERATURE: 98 F | HEART RATE: 80 BPM | SYSTOLIC BLOOD PRESSURE: 131 MMHG | RESPIRATION RATE: 16 BRPM | DIASTOLIC BLOOD PRESSURE: 87 MMHG | OXYGEN SATURATION: 98 % | HEIGHT: 64 IN | WEIGHT: 273.2 LBS

## 2021-11-19 DIAGNOSIS — C22.1 CHOLANGIOCARCINOMA (HCC): Primary | ICD-10-CM

## 2021-11-19 LAB
BASOPHILS # BLD AUTO: 0.05 10*3/MM3 (ref 0–0.2)
BASOPHILS NFR BLD AUTO: 0.8 % (ref 0–1.5)
DEPRECATED RDW RBC AUTO: 50.4 FL (ref 37–54)
EOSINOPHIL # BLD AUTO: 0.11 10*3/MM3 (ref 0–0.4)
EOSINOPHIL NFR BLD AUTO: 1.7 % (ref 0.3–6.2)
ERYTHROCYTE [DISTWIDTH] IN BLOOD BY AUTOMATED COUNT: 14.9 % (ref 12.3–15.4)
HCT VFR BLD AUTO: 41.3 % (ref 34–46.6)
HGB BLD-MCNC: 13.1 G/DL (ref 12–15.9)
IMM GRANULOCYTES # BLD AUTO: 0.05 10*3/MM3 (ref 0–0.05)
IMM GRANULOCYTES NFR BLD AUTO: 0.8 % (ref 0–0.5)
LYMPHOCYTES # BLD AUTO: 2.15 10*3/MM3 (ref 0.7–3.1)
LYMPHOCYTES NFR BLD AUTO: 32.6 % (ref 19.6–45.3)
MCH RBC QN AUTO: 30 PG (ref 26.6–33)
MCHC RBC AUTO-ENTMCNC: 31.7 G/DL (ref 31.5–35.7)
MCV RBC AUTO: 94.7 FL (ref 79–97)
MONOCYTES # BLD AUTO: 0.84 10*3/MM3 (ref 0.1–0.9)
MONOCYTES NFR BLD AUTO: 12.7 % (ref 5–12)
NEUTROPHILS NFR BLD AUTO: 3.39 10*3/MM3 (ref 1.7–7)
NEUTROPHILS NFR BLD AUTO: 51.4 % (ref 42.7–76)
NRBC BLD AUTO-RTO: 0 /100 WBC (ref 0–0.2)
PLATELET # BLD AUTO: 432 10*3/MM3 (ref 140–450)
PMV BLD AUTO: 9 FL (ref 6–12)
RBC # BLD AUTO: 4.36 10*6/MM3 (ref 3.77–5.28)
WBC NRBC COR # BLD: 6.59 10*3/MM3 (ref 3.4–10.8)

## 2021-11-19 PROCEDURE — 25010000002 GEMCITABINE 200 MG/5.26ML SOLUTION 5.26 ML VIAL: Performed by: INTERNAL MEDICINE

## 2021-11-19 PROCEDURE — 36415 COLL VENOUS BLD VENIPUNCTURE: CPT

## 2021-11-19 PROCEDURE — 85025 COMPLETE CBC W/AUTO DIFF WBC: CPT

## 2021-11-19 PROCEDURE — 25010000002 DEXAMETHASONE PER 1 MG: Performed by: INTERNAL MEDICINE

## 2021-11-19 PROCEDURE — 99215 OFFICE O/P EST HI 40 MIN: CPT | Performed by: INTERNAL MEDICINE

## 2021-11-19 PROCEDURE — 25010000002 HEPARIN LOCK FLUSH PER 10 UNITS: Performed by: INTERNAL MEDICINE

## 2021-11-19 PROCEDURE — 25010000002 PACLITAXEL PROTEIN-BOUND PART PER 1 MG: Performed by: INTERNAL MEDICINE

## 2021-11-19 PROCEDURE — 96375 TX/PRO/DX INJ NEW DRUG ADDON: CPT

## 2021-11-19 PROCEDURE — 25010000002 GEMCITABINE 1 GM/26.3ML SOLUTION 26.3 ML VIAL: Performed by: INTERNAL MEDICINE

## 2021-11-19 PROCEDURE — 96413 CHEMO IV INFUSION 1 HR: CPT

## 2021-11-19 PROCEDURE — 96417 CHEMO IV INFUS EACH ADDL SEQ: CPT

## 2021-11-19 RX ORDER — PACLITAXEL 100 MG/20ML
125 INJECTION, POWDER, LYOPHILIZED, FOR SUSPENSION INTRAVENOUS ONCE
OUTPATIENT
Start: 2021-12-24

## 2021-11-19 RX ORDER — PACLITAXEL 100 MG/20ML
125 INJECTION, POWDER, LYOPHILIZED, FOR SUSPENSION INTRAVENOUS ONCE
Status: CANCELLED | OUTPATIENT
Start: 2021-12-10

## 2021-11-19 RX ORDER — SODIUM CHLORIDE 0.9 % (FLUSH) 0.9 %
10 SYRINGE (ML) INJECTION AS NEEDED
Status: DISCONTINUED | OUTPATIENT
Start: 2021-11-19 | End: 2021-11-19 | Stop reason: HOSPADM

## 2021-11-19 RX ORDER — SODIUM CHLORIDE 9 MG/ML
250 INJECTION, SOLUTION INTRAVENOUS ONCE
Status: DISCONTINUED | OUTPATIENT
Start: 2021-11-19 | End: 2021-11-19 | Stop reason: HOSPADM

## 2021-11-19 RX ORDER — SODIUM CHLORIDE 0.9 % (FLUSH) 0.9 %
10 SYRINGE (ML) INJECTION AS NEEDED
Status: CANCELLED | OUTPATIENT
Start: 2021-11-19

## 2021-11-19 RX ORDER — HEPARIN SODIUM (PORCINE) LOCK FLUSH IV SOLN 100 UNIT/ML 100 UNIT/ML
500 SOLUTION INTRAVENOUS AS NEEDED
Status: CANCELLED | OUTPATIENT
Start: 2021-11-19

## 2021-11-19 RX ORDER — SODIUM CHLORIDE 9 MG/ML
250 INJECTION, SOLUTION INTRAVENOUS ONCE
Status: CANCELLED | OUTPATIENT
Start: 2021-12-10

## 2021-11-19 RX ORDER — SODIUM CHLORIDE 9 MG/ML
250 INJECTION, SOLUTION INTRAVENOUS ONCE
OUTPATIENT
Start: 2021-12-24

## 2021-11-19 RX ORDER — PACLITAXEL 100 MG/20ML
125 INJECTION, POWDER, LYOPHILIZED, FOR SUSPENSION INTRAVENOUS ONCE
Status: COMPLETED | OUTPATIENT
Start: 2021-11-19 | End: 2021-11-19

## 2021-11-19 RX ORDER — HEPARIN SODIUM (PORCINE) LOCK FLUSH IV SOLN 100 UNIT/ML 100 UNIT/ML
500 SOLUTION INTRAVENOUS AS NEEDED
Status: DISCONTINUED | OUTPATIENT
Start: 2021-11-19 | End: 2021-11-19 | Stop reason: HOSPADM

## 2021-11-19 RX ADMIN — HEPARIN 500 UNITS: 100 SYRINGE at 12:15

## 2021-11-19 RX ADMIN — SODIUM CHLORIDE, PRESERVATIVE FREE 10 ML: 5 INJECTION INTRAVENOUS at 12:15

## 2021-11-19 RX ADMIN — GEMCITABINE HYDROCHLORIDE 2250 MG: 1 INJECTION, SOLUTION INTRAVENOUS at 11:40

## 2021-11-19 RX ADMIN — DEXAMETHASONE SODIUM PHOSPHATE 12 MG: 4 INJECTION, SOLUTION INTRA-ARTICULAR; INTRALESIONAL; INTRAMUSCULAR; INTRAVENOUS; SOFT TISSUE at 10:12

## 2021-11-19 RX ADMIN — PACLITAXEL 285 MG: 100 INJECTION, POWDER, LYOPHILIZED, FOR SUSPENSION INTRAVENOUS at 10:57

## 2021-11-19 NOTE — PROGRESS NOTES
Pt. Requested to meet with nurse navigator to obtain assistance with travel. Pt. Was given two gas cards to assist with her travel.

## 2021-11-19 NOTE — PROGRESS NOTES
PROBLEM LIST:  Oncology/Hematology History   Cholangiocarcinoma (HCC)   10/29/2021 Initial Diagnosis    Cholangiocarcinoma (HCC)     11/1/2021 Imaging    Impression  Performed by IMAGING  There is extensive mediastinal lymphadenopathy with numerous enlarged hypodense nodes especially involving the right paratracheal, precarinal and subcarinal regions. The low-attenuation may reflect the presence of necrosis.     There are also prominent epicardial nodes.     There are number of very small peripheral nodules bilaterally as described above. Recommend attention to these small nodules on subsequent studies. No infiltrates or effusions.   Faint poorly defined mass involving the medial aspect of the liver-image 34, series 5. This is suspicious for neoplasm. A smaller hypodensity is noted superiorly and anteriorly in the right lobe.     There is periportal, portacaval and bulky retroperitoneal lymphadenopathy. Many of the nodes appears hypodense suggesting possible necrosis.     There is a 3.5 x 4.3 cm hypodensity involving the left pelvis adjacent to the uterus. Possible left adnexal cyst. Recommend further evaluation with ultrasound. This is larger than on 10/19/2021.        11/1/2021 Biopsy    Final Diagnosis     A. LEFT HEPATIC DUCT STRICTURE, BIOPSY:      -  POORLY DIFFERENTIATED ADENOCARCINOMA.        11/5/2021 -  Chemotherapy    OP PANCREATIC PACLitaxel Protein-Bound / Gemcitabine         REASON FOR VISIT: Management of my cholangiocarcinoma    HISTORY OF PRESENT ILLNESS:   51 y.o.  female presents today for follow-up of her cholangiocarcinoma.  She initiated chemotherapy with Abraxane and gemcitabine.  Seems to have tolerated it reasonably well.  She has some fatigue issues.  Occasional nausea.  No issues with pain.  Appetite has been poor.  She is experiencing some hair loss.    Past medical history, social history and family history was reviewed 11/19/21 and unchanged from prior visit.    Review of  Systems:    Review of Systems   Constitutional: Positive for appetite change and fatigue.   HENT:  Negative.    Eyes: Negative.    Respiratory: Negative.    Cardiovascular: Negative.    Gastrointestinal: Positive for abdominal distention.   Endocrine: Negative.    Musculoskeletal: Negative.    Skin: Negative.    Hematological: Negative.    Psychiatric/Behavioral: The patient is nervous/anxious.             Medications:        Current Outpatient Medications:   •  HM Pain Relief Extra Strength 500 MG tablet, Take 500 mg by mouth Every 4 (Four) Hours As Needed., Disp: , Rfl:   •  ibuprofen (ADVIL,MOTRIN) 800 MG tablet, Take 800 mg by mouth Every 8 (Eight) Hours As Needed., Disp: , Rfl:   •  levothyroxine (SYNTHROID, LEVOTHROID) 50 MCG tablet, Take 50 mcg by mouth Daily., Disp: , Rfl:   •  lidocaine-prilocaine (EMLA) 2.5-2.5 % cream, Apply 1 application topically to the appropriate area as directed As Needed (prior to port access)., Disp: 30 g, Rfl: 3  •  omeprazole (priLOSEC) 40 MG capsule, Take 40 mg by mouth Daily., Disp: , Rfl:   •  oxyCODONE (Roxicodone) 5 MG immediate release tablet, Take 1 tablet by mouth Every 4 (Four) Hours As Needed for Moderate Pain ., Disp: 60 tablet, Rfl: 0  No current facility-administered medications for this visit.    Facility-Administered Medications Ordered in Other Visits:   •  dexamethasone (DECADRON) 12 mg in sodium chloride 0.9 % IVPB, 12 mg, Intravenous, Once, Ramon Lubin MD  •  gemcitabine (GEMZAR) 2,250 mg in sodium chloride 0.9 % 309.2 mL chemo IVPB, 1,000 mg/m2 (Treatment Plan Recorded), Intravenous, Once, Ramon Lubin MD  •  heparin injection 500 Units, 500 Units, Intravenous, PRN, Ramon Lubin MD  •  PACLitaxel-protein bound (ABRAXANE) chemo injection 285 mg, 125 mg/m2 (Treatment Plan Recorded), Intravenous, Once, Ramon Lubin MD  •  sodium chloride 0.9 % flush 10 mL, 10 mL, Intravenous, PRN, Ramon Lubin MD  •  sodium chloride  "0.9 % infusion 250 mL, 250 mL, Intravenous, Once, Ramon Lubin MD    Pain Medications             HM Pain Relief Extra Strength 500 MG tablet Take 500 mg by mouth Every 4 (Four) Hours As Needed.    ibuprofen (ADVIL,MOTRIN) 800 MG tablet Take 800 mg by mouth Every 8 (Eight) Hours As Needed.    oxyCODONE (Roxicodone) 5 MG immediate release tablet Take 1 tablet by mouth Every 4 (Four) Hours As Needed for Moderate Pain .             ALLERGIES:    Allergies   Allergen Reactions   • Sumatriptan Headache         Physical Exam    VITAL SIGNS:  /87   Pulse 80   Temp 98 °F (36.7 °C) (Temporal)   Resp 16   Ht 162.6 cm (64\")   Wt 124 kg (273 lb 3.2 oz)   SpO2 98%   BMI 46.89 kg/m²     ECOG score: 2           Wt Readings from Last 3 Encounters:   11/19/21 124 kg (273 lb 3.2 oz)   11/05/21 125 kg (276 lb)   10/29/21 127 kg (280 lb 12.8 oz)       Body mass index is 46.89 kg/m². Body surface area is 2.23 meters squared.    Pain Score    11/19/21 0846   PainSc: 0-No pain           Performance Status: 1    General: well appearing, in no acute distress, morbid obesity  HEENT: sclera anicteric, neck is supple  Lymphatics: no cervical, supraclavicular, or axillary adenopathy  Cardiovascular: regular rate and rhythm, no murmurs, rubs or gallops  Lungs: clear to auscultation bilaterally  Abdomen: soft, nontender, nondistended.  No palpable organomegaly  Extremities: no lower extremity edema  Skin: no rashes, lesions, bruising, or petechiae  Msk:  Shows no weakness of the large muscle groups  Psych: Mood is stable        RECENT LABS:    Lab Results   Component Value Date    HGB 13.1 11/19/2021    HCT 41.3 11/19/2021    MCV 94.7 11/19/2021     11/19/2021    WBC 6.59 11/19/2021    NEUTROABS 3.39 11/19/2021    LYMPHSABS 2.15 11/19/2021    MONOSABS 0.84 11/19/2021    EOSABS 0.11 11/19/2021    BASOSABS 0.05 11/19/2021       Lab Results   Component Value Date    GLUCOSE 180 (H) 11/05/2021    BUN 11 11/05/2021    " CREATININE 0.61 11/05/2021     (L) 11/05/2021    K 4.0 11/05/2021    CL 99 11/05/2021    CO2 25.8 11/05/2021    CALCIUM 9.3 11/05/2021    PROTEINTOT 7.5 11/05/2021    ALBUMIN 3.40 (L) 11/05/2021    BILITOT 2.1 (H) 11/05/2021    ALKPHOS 422 (H) 11/05/2021    AST 85 (H) 11/05/2021    ALT 46 (H) 11/05/2021         No results found for:     Assessment/Plan    1.  Metastatic cholangiocarcinoma.  I sent Caris testing which confirmed the diagnosis.  It also failed to reveal any targetable mutation.  So we have to depend on conventional chemotherapy going forward.  Her bilirubin had improved since stent placement.  I started her on Abraxane and gemcitabine and she seems to be tolerating it reasonably well.  I did review the CAT scans from [facility] which showed significant disease involving lymph nodes in the chest and abdomen.  She has clearly stage IV disease and surgical intervention has no significant role here.  We discussed prognosis of this disease.  This is not a curable disease and treatment is palliative in nature.    2.  Morbid obesity.  This complicates dosing of her chemotherapy and managing toxicity.        Total time of patient care on day of service including time prior to, face to face with patient, and following visit spent in reviewing records, lab results, imaging studies, discussion with patient, and documentation/charting was > 50 minutes.     Ramon Lubin MD  [facility] Hematology and Oncology    Return on: 12/17/21  Return in (Approximately): Schedule with next infusion, 1 month    Orders Placed This Encounter   Procedures   • Comprehensive metabolic panel   • CBC and Differential   • CBC and Differential       11/19/2021

## 2021-11-24 ENCOUNTER — TELEPHONE (OUTPATIENT)
Dept: ONCOLOGY | Facility: CLINIC | Age: 51
End: 2021-11-24

## 2021-11-24 DIAGNOSIS — C22.1 CHOLANGIOCARCINOMA (HCC): Primary | ICD-10-CM

## 2021-11-24 RX ORDER — FENTANYL 25 UG/H
1 PATCH TRANSDERMAL
Qty: 10 EACH | Refills: 0 | Status: SHIPPED | OUTPATIENT
Start: 2021-11-24 | End: 2021-12-03 | Stop reason: DRUGHIGH

## 2021-11-24 NOTE — TELEPHONE ENCOUNTER
Called patient discussing with her that Dr. Sultana will have nurse send in Fentanyl patient in that she will change out every three days. Informing her to rotate arm to shoulder. Nurse discussing with her that once the medication gets into her system she may be able to take less oral pain medication. Patient asking about how soon can she apply patch. Informing patient to apply patch as soon as she gets it, informing her it may take up to 8 hours to get into her system and to go ahead and apply. Patient reporting that she was discharged from hospital yesterday and that she has so much swelling. Discussing with the patient that cancer patient's are at an increased risk of developing clots and that when patient's are in the hospital they receive large amounts of fluids and it will take some time for her to get rid of it. Patient reporting that she has been on Lasix trying to get some of the fluids pulled off. Nurse and patient discussed healing process. Informing patient if she had any issues to call. Patient stating she understood.

## 2021-11-24 NOTE — TELEPHONE ENCOUNTER
Call patient informing her that PA was done. Informing her nurse will keep an eye out for approval.

## 2021-11-30 ENCOUNTER — DOCUMENTATION (OUTPATIENT)
Dept: NUTRITION | Facility: HOSPITAL | Age: 51
End: 2021-11-30

## 2021-11-30 NOTE — PROGRESS NOTES
"Oncology Nutrition Screening    Patient Name:  Enedina Yeboah  YOB: 1970  MRN: [id number]  Date:  11/30/21  Physician:  CORA Lubin MD    Type of Cancer Treatment:   Chemotherapy:  Type: Palliative, Abraxane  Treatment Schedule: 6 treatments every 3 weeks    Patient Active Problem List   Diagnosis   • Cholangiocarcinoma (HCC)       Current Outpatient Medications   Medication Sig Dispense Refill   • fentaNYL (DURAGESIC) 25 MCG/HR patch Place 1 patch on the skin as directed by provider Every 72 (Seventy-Two) Hours. 10 each 0   • HM Pain Relief Extra Strength 500 MG tablet Take 500 mg by mouth Every 4 (Four) Hours As Needed.     • ibuprofen (ADVIL,MOTRIN) 800 MG tablet Take 800 mg by mouth Every 8 (Eight) Hours As Needed.     • levothyroxine (SYNTHROID, LEVOTHROID) 50 MCG tablet Take 50 mcg by mouth Daily.     • lidocaine-prilocaine (EMLA) 2.5-2.5 % cream Apply 1 application topically to the appropriate area as directed As Needed (prior to port access). 30 g 3   • omeprazole (priLOSEC) 40 MG capsule Take 40 mg by mouth Daily.     • oxyCODONE (Roxicodone) 5 MG immediate release tablet Take 1 tablet by mouth Every 4 (Four) Hours As Needed for Moderate Pain . 60 tablet 0     No current facility-administered medications for this visit.       Glycemic Risk:   Steriods    Weight:   Height: 64\"  Weight: 273 lbs.  Usual Body Weight: 300 lbs.   BMI: 46.89  Obese  Weight has decreased 27 pounds over last ~3 months    Oral Food Intake:  Compared to normal intake, current food intake is less than normal    Hydration Status:   How many 8 ounce glass of water of fluid do you drink per day?  4    Enteral Feeding:   N/A    Nutrition Symptoms:   Altered Apetite  Altered Taste Perception  Dry Mouth  Mouth Sores  Esophagitis  Nausea  Constipation  Fatigue  Coughing a lot    Activity:   Not feeling up to most things, but in bed less than half the day     reports that she quit smoking about 3 years ago. She smoked " 0.25 packs per day. She has never used smokeless tobacco. She reports that she does not drink alcohol and does not use drugs.    Evaluation of Nutritional Risk:   Patient identified to be at nutritional risk and/or for nutritional consultation; will follow patient through course of treatment.   Diagnosis  Weight Change  Nutrition Impact Symptoms  Patient Education        Electronically signed by:  Renate Arthur RD  12:10 EST

## 2021-12-03 ENCOUNTER — INFUSION (OUTPATIENT)
Dept: ONCOLOGY | Facility: HOSPITAL | Age: 51
End: 2021-12-03

## 2021-12-03 ENCOUNTER — OFFICE VISIT (OUTPATIENT)
Dept: ONCOLOGY | Facility: CLINIC | Age: 51
End: 2021-12-03

## 2021-12-03 VITALS
RESPIRATION RATE: 16 BRPM | HEIGHT: 64 IN | WEIGHT: 268 LBS | HEART RATE: 101 BPM | BODY MASS INDEX: 45.75 KG/M2 | TEMPERATURE: 97.3 F | DIASTOLIC BLOOD PRESSURE: 63 MMHG | SYSTOLIC BLOOD PRESSURE: 136 MMHG | OXYGEN SATURATION: 95 %

## 2021-12-03 DIAGNOSIS — C22.1 CHOLANGIOCARCINOMA (HCC): Primary | ICD-10-CM

## 2021-12-03 DIAGNOSIS — C22.1 CHOLANGIOCARCINOMA (HCC): ICD-10-CM

## 2021-12-03 LAB
ABO GROUP BLD: NORMAL
ABO GROUP BLD: NORMAL
ALBUMIN SERPL-MCNC: 2.9 G/DL (ref 3.5–5.2)
ALBUMIN/GLOB SERPL: 0.8 G/DL
ALP SERPL-CCNC: 179 U/L (ref 39–117)
ALT SERPL W P-5'-P-CCNC: 36 U/L (ref 1–33)
ANION GAP SERPL CALCULATED.3IONS-SCNC: 10.6 MMOL/L (ref 5–15)
ANTI-E: NORMAL
AST SERPL-CCNC: 48 U/L (ref 1–32)
BASOPHILS # BLD AUTO: 0.03 10*3/MM3 (ref 0–0.2)
BASOPHILS NFR BLD AUTO: 0.2 % (ref 0–1.5)
BILIRUB SERPL-MCNC: 0.6 MG/DL (ref 0–1.2)
BLD GP AB SCN SERPL QL: POSITIVE
BUN SERPL-MCNC: 12 MG/DL (ref 6–20)
BUN/CREAT SERPL: 17.4 (ref 7–25)
CALCIUM SPEC-SCNC: 9 MG/DL (ref 8.6–10.5)
CHLORIDE SERPL-SCNC: 91 MMOL/L (ref 98–107)
CO2 SERPL-SCNC: 33.4 MMOL/L (ref 22–29)
CREAT SERPL-MCNC: 0.69 MG/DL (ref 0.57–1)
DEPRECATED RDW RBC AUTO: 53.8 FL (ref 37–54)
EOSINOPHIL # BLD AUTO: 0.01 10*3/MM3 (ref 0–0.4)
EOSINOPHIL NFR BLD AUTO: 0.1 % (ref 0.3–6.2)
ERYTHROCYTE [DISTWIDTH] IN BLOOD BY AUTOMATED COUNT: 16 % (ref 12.3–15.4)
GFR SERPL CREATININE-BSD FRML MDRD: 90 ML/MIN/1.73
GLOBULIN UR ELPH-MCNC: 3.8 GM/DL
GLUCOSE SERPL-MCNC: 135 MG/DL (ref 65–99)
HCT VFR BLD AUTO: 37 % (ref 34–46.6)
HGB BLD-MCNC: 11.8 G/DL (ref 12–15.9)
IMM GRANULOCYTES # BLD AUTO: 0.59 10*3/MM3 (ref 0–0.05)
IMM GRANULOCYTES NFR BLD AUTO: 4.6 % (ref 0–0.5)
LYMPHOCYTES # BLD AUTO: 1.34 10*3/MM3 (ref 0.7–3.1)
LYMPHOCYTES NFR BLD AUTO: 10.4 % (ref 19.6–45.3)
MCH RBC QN AUTO: 30.2 PG (ref 26.6–33)
MCHC RBC AUTO-ENTMCNC: 31.9 G/DL (ref 31.5–35.7)
MCV RBC AUTO: 94.6 FL (ref 79–97)
MONOCYTES # BLD AUTO: 1.08 10*3/MM3 (ref 0.1–0.9)
MONOCYTES NFR BLD AUTO: 8.4 % (ref 5–12)
NEUTROPHILS NFR BLD AUTO: 76.3 % (ref 42.7–76)
NEUTROPHILS NFR BLD AUTO: 9.84 10*3/MM3 (ref 1.7–7)
NRBC BLD AUTO-RTO: 0 /100 WBC (ref 0–0.2)
PLATELET # BLD AUTO: 350 10*3/MM3 (ref 140–450)
PMV BLD AUTO: 9.3 FL (ref 6–12)
POTASSIUM SERPL-SCNC: 3.5 MMOL/L (ref 3.5–5.2)
PROT SERPL-MCNC: 6.7 G/DL (ref 6–8.5)
RBC # BLD AUTO: 3.91 10*6/MM3 (ref 3.77–5.28)
RH BLD: POSITIVE
RH BLD: POSITIVE
SODIUM SERPL-SCNC: 135 MMOL/L (ref 136–145)
T&S EXPIRATION DATE: NORMAL
WBC NRBC COR # BLD: 12.89 10*3/MM3 (ref 3.4–10.8)

## 2021-12-03 PROCEDURE — 86901 BLOOD TYPING SEROLOGIC RH(D): CPT

## 2021-12-03 PROCEDURE — 86900 BLOOD TYPING SEROLOGIC ABO: CPT

## 2021-12-03 PROCEDURE — 25010000002 HEPARIN LOCK FLUSH PER 10 UNITS: Performed by: INTERNAL MEDICINE

## 2021-12-03 PROCEDURE — 85025 COMPLETE CBC W/AUTO DIFF WBC: CPT | Performed by: NURSE PRACTITIONER

## 2021-12-03 PROCEDURE — 36415 COLL VENOUS BLD VENIPUNCTURE: CPT

## 2021-12-03 PROCEDURE — 86850 RBC ANTIBODY SCREEN: CPT

## 2021-12-03 PROCEDURE — 86870 RBC ANTIBODY IDENTIFICATION: CPT

## 2021-12-03 PROCEDURE — 96523 IRRIG DRUG DELIVERY DEVICE: CPT

## 2021-12-03 PROCEDURE — 36415 COLL VENOUS BLD VENIPUNCTURE: CPT | Performed by: NURSE PRACTITIONER

## 2021-12-03 PROCEDURE — 99215 OFFICE O/P EST HI 40 MIN: CPT | Performed by: NURSE PRACTITIONER

## 2021-12-03 PROCEDURE — 80053 COMPREHEN METABOLIC PANEL: CPT | Performed by: NURSE PRACTITIONER

## 2021-12-03 RX ORDER — APIXABAN 5 MG/1
TABLET, FILM COATED ORAL
COMMUNITY
Start: 2021-11-23

## 2021-12-03 RX ORDER — FUROSEMIDE 40 MG/1
TABLET ORAL
COMMUNITY
Start: 2021-11-23

## 2021-12-03 RX ORDER — HEPARIN SODIUM (PORCINE) LOCK FLUSH IV SOLN 100 UNIT/ML 100 UNIT/ML
500 SOLUTION INTRAVENOUS AS NEEDED
Status: CANCELLED | OUTPATIENT
Start: 2021-12-03

## 2021-12-03 RX ORDER — SODIUM CHLORIDE 0.9 % (FLUSH) 0.9 %
10 SYRINGE (ML) INJECTION AS NEEDED
Status: DISCONTINUED | OUTPATIENT
Start: 2021-12-03 | End: 2021-12-03 | Stop reason: HOSPADM

## 2021-12-03 RX ORDER — SODIUM CHLORIDE 0.9 % (FLUSH) 0.9 %
10 SYRINGE (ML) INJECTION AS NEEDED
Status: CANCELLED | OUTPATIENT
Start: 2021-12-03

## 2021-12-03 RX ORDER — FENTANYL 12 UG/H
PATCH TRANSDERMAL
Qty: 10 PATCH | Refills: 0 | Status: SHIPPED | OUTPATIENT
Start: 2021-12-03

## 2021-12-03 RX ORDER — HEPARIN SODIUM (PORCINE) LOCK FLUSH IV SOLN 100 UNIT/ML 100 UNIT/ML
500 SOLUTION INTRAVENOUS AS NEEDED
Status: DISCONTINUED | OUTPATIENT
Start: 2021-12-03 | End: 2021-12-03 | Stop reason: HOSPADM

## 2021-12-03 RX ORDER — FENTANYL 25 UG/H
PATCH TRANSDERMAL
Qty: 10 PATCH | Refills: 0 | Status: SHIPPED | OUTPATIENT
Start: 2021-12-03 | End: 2021-12-15 | Stop reason: SDUPTHER

## 2021-12-03 RX ORDER — OXYCODONE AND ACETAMINOPHEN 10; 325 MG/1; MG/1
TABLET ORAL
COMMUNITY
Start: 2021-11-23 | End: 2021-12-03 | Stop reason: DRUGHIGH

## 2021-12-03 RX ORDER — OXYCODONE HYDROCHLORIDE 5 MG/1
5 TABLET ORAL EVERY 4 HOURS PRN
Qty: 60 TABLET | Refills: 0 | Status: SHIPPED | OUTPATIENT
Start: 2021-12-03

## 2021-12-03 RX ADMIN — HEPARIN 500 UNITS: 100 SYRINGE at 10:15

## 2021-12-03 RX ADMIN — SODIUM CHLORIDE, PRESERVATIVE FREE 10 ML: 5 INJECTION INTRAVENOUS at 10:16

## 2021-12-03 NOTE — PROGRESS NOTES
PROBLEM LIST:  Oncology/Hematology History   Cholangiocarcinoma (HCC)   10/29/2021 Initial Diagnosis    Cholangiocarcinoma (HCC)     11/1/2021 Imaging    Impression  Performed by IMAGING  There is extensive mediastinal lymphadenopathy with numerous enlarged hypodense nodes especially involving the right paratracheal, precarinal and subcarinal regions. The low-attenuation may reflect the presence of necrosis.     There are also prominent epicardial nodes.     There are number of very small peripheral nodules bilaterally as described above. Recommend attention to these small nodules on subsequent studies. No infiltrates or effusions.   Faint poorly defined mass involving the medial aspect of the liver-image 34, series 5. This is suspicious for neoplasm. A smaller hypodensity is noted superiorly and anteriorly in the right lobe.     There is periportal, portacaval and bulky retroperitoneal lymphadenopathy. Many of the nodes appears hypodense suggesting possible necrosis.     There is a 3.5 x 4.3 cm hypodensity involving the left pelvis adjacent to the uterus. Possible left adnexal cyst. Recommend further evaluation with ultrasound. This is larger than on 10/19/2021.        11/1/2021 Biopsy    Final Diagnosis     A. LEFT HEPATIC DUCT STRICTURE, BIOPSY:      -  POORLY DIFFERENTIATED ADENOCARCINOMA.        11/5/2021 -  Chemotherapy    OP PANCREATIC PACLitaxel Protein-Bound / Gemcitabine         REASON FOR VISIT: Management of my cholangiocarcinoma    HISTORY OF PRESENT ILLNESS:   51 y.o.  female presents today for follow-up of her cholangiocarcinoma.  She started chemotherapy with Abraxane and gemcitabine. She is here today for cycle #2.   She was admitted to ICU with PE and significant swelling after last cycle. Her fatigue is significant and can barely shower without getting short of breath. Pain is controlled with fentanyl patch and [facility] gave her oxycodone 10mg that works better than 5mg. Appetite has been  poor. She has lost some weight.  She is experiencing some hair loss.  She does not feel she is strong enough for treatment today.     Past medical history, social history and family history was reviewed 12/03/21 and unchanged from prior visit.    Review of Systems:    Review of Systems   Constitutional: Positive for appetite change and fatigue.   HENT:  Negative.    Eyes: Negative.    Respiratory: Negative.    Cardiovascular: Negative.    Gastrointestinal: Positive for abdominal distention.   Endocrine: Negative.    Musculoskeletal: Negative.    Skin: Negative.    Hematological: Negative.    Psychiatric/Behavioral: The patient is nervous/anxious.             Medications:        Current Outpatient Medications:   •  Eliquis 5 MG tablet tablet, , Disp: , Rfl:   •  fentaNYL (DURAGESIC) 25 MCG/HR patch, Place 1 patch on the skin as directed by provider Every 72 (Seventy-Two) Hours., Disp: 10 each, Rfl: 0  •  furosemide (LASIX) 40 MG tablet, , Disp: , Rfl:   •  HM Pain Relief Extra Strength 500 MG tablet, Take 500 mg by mouth Every 4 (Four) Hours As Needed., Disp: , Rfl:   •  ibuprofen (ADVIL,MOTRIN) 800 MG tablet, Take 800 mg by mouth Every 8 (Eight) Hours As Needed., Disp: , Rfl:   •  levothyroxine (SYNTHROID, LEVOTHROID) 50 MCG tablet, Take 50 mcg by mouth Daily., Disp: , Rfl:   •  lidocaine-prilocaine (EMLA) 2.5-2.5 % cream, Apply 1 application topically to the appropriate area as directed As Needed (prior to port access)., Disp: 30 g, Rfl: 3  •  omeprazole (priLOSEC) 40 MG capsule, Take 40 mg by mouth Daily., Disp: , Rfl:   •  oxyCODONE (Roxicodone) 5 MG immediate release tablet, Take 1 tablet by mouth Every 4 (Four) Hours As Needed for Moderate Pain ., Disp: 60 tablet, Rfl: 0  •  oxyCODONE-acetaminophen (PERCOCET)  MG per tablet, , Disp: , Rfl:   No current facility-administered medications for this visit.    Facility-Administered Medications Ordered in Other Visits:   •  heparin injection 500 Units, 500  "Units, Intravenous, PRN, Ramon Lubin MD  •  sodium chloride 0.9 % flush 10 mL, 10 mL, Intravenous, PRN, Ramno Lubin MD    Pain Medications             fentaNYL (DURAGESIC) 25 MCG/HR patch Place 1 patch on the skin as directed by provider Every 72 (Seventy-Two) Hours.    HM Pain Relief Extra Strength 500 MG tablet Take 500 mg by mouth Every 4 (Four) Hours As Needed.    ibuprofen (ADVIL,MOTRIN) 800 MG tablet Take 800 mg by mouth Every 8 (Eight) Hours As Needed.    oxyCODONE (Roxicodone) 5 MG immediate release tablet Take 1 tablet by mouth Every 4 (Four) Hours As Needed for Moderate Pain .    oxyCODONE-acetaminophen (PERCOCET)  MG per tablet              ALLERGIES:    Allergies   Allergen Reactions   • Sumatriptan Headache         Physical Exam    VITAL SIGNS:  /63   Pulse 101   Temp 97.3 °F (36.3 °C) (Temporal)   Resp 16   Ht 162.6 cm (64\")   Wt 122 kg (268 lb)   SpO2 95%   BMI 46.00 kg/m²     ECOG score: 2           Wt Readings from Last 3 Encounters:   12/03/21 122 kg (268 lb)   11/19/21 124 kg (273 lb 3.2 oz)   11/05/21 125 kg (276 lb)       Body mass index is 46 kg/m². Body surface area is 2.22 meters squared.    Pain Score    12/03/21 0841   PainSc: 0-No pain           Performance Status: 1    General: well appearing female in no acute distress  Neuro: alert and oriented  HEENT: sclera anicteric, oropharynx clear  Lymphatics: no cervical, supraclavicular, or axillary adenopathy  Cardiovascular: regular rate and rhythm, no murmurs  Lungs: clear to auscultation bilaterally  Abdomen: soft, nontender, nondistended.  No palpable organomegaly  Extremeties: no lower extremity edema  Skin: no rashes, lesions, bruising, or petechiae  Psych: mood and affect appropriate        RECENT LABS:    Lab Results   Component Value Date    HGB 13.1 11/19/2021    HCT 41.3 11/19/2021    MCV 94.7 11/19/2021     11/19/2021    WBC 6.59 11/19/2021    NEUTROABS 3.39 11/19/2021    LYMPHSABS 2.15 " 11/19/2021    MONOSABS 0.84 11/19/2021    EOSABS 0.11 11/19/2021    BASOSABS 0.05 11/19/2021       Lab Results   Component Value Date    GLUCOSE 180 (H) 11/05/2021    BUN 11 11/05/2021    CREATININE 0.61 11/05/2021     (L) 11/05/2021    K 4.0 11/05/2021    CL 99 11/05/2021    CO2 25.8 11/05/2021    CALCIUM 9.3 11/05/2021    PROTEINTOT 7.5 11/05/2021    ALBUMIN 3.40 (L) 11/05/2021    BILITOT 2.1 (H) 11/05/2021    ALKPHOS 422 (H) 11/05/2021    AST 85 (H) 11/05/2021    ALT 46 (H) 11/05/2021         No results found for:     Assessment/Plan    1.  Metastatic cholangiocarcinoma.  Caris testing confirmed the diagnosis. Unfortunately,  failed to reveal any targetable mutation.     We will hold cycle #2 Abraxane and gemcitabine today. She continues to recover from recent hospitalization and PE.  We reviewed prognosis of this disease.  This is not a curable disease and treatment is palliative in nature. She will follow up in one week for cycle #4.     2. Cancer related pain. Since hospitalization pain has worsened. We will increase fentanyl patch at 37.5mcg. We will continue with oxycodone to 5mg every 4 hours as needed for pain. I will refill today.     3.  Morbid obesity.  This complicates dosing of her chemotherapy and managing toxicity.    4. Bruising. We will check cbc for platelet count today.     5. Fatigue. We will check labs to see if HGB is less than 8. If so we will transfuse with 2 units PRBC.       Total time of patient care including time prior to, face to face with patient, and following visit spent in reviewing records, lab results, imaging studies, discussion with patient, and documentation/charting was > 40 minutes           ANGELIQUE Kelley  [facility] Hematology and Oncology         Orders Placed This Encounter   Procedures   • Cancer Antigen 19-9   • Comprehensive Metabolic Panel   • CBC Auto Differential   • CBC & Differential       12/3/2021

## 2021-12-06 ENCOUNTER — TELEPHONE (OUTPATIENT)
Dept: ONCOLOGY | Facility: CLINIC | Age: 51
End: 2021-12-06

## 2021-12-06 NOTE — TELEPHONE ENCOUNTER
Called patient to let her know she could double up on her dose. But she shouldn't take it at bedtime. unable to leave message will call patient tomorrow.

## 2021-12-06 NOTE — TELEPHONE ENCOUNTER
Caller: Enedina Yeboah    Relationship: Self    Best call back number: [phone]    What medications are you currently taking:   Current Outpatient Medications on File Prior to Visit   Medication Sig Dispense Refill   • Eliquis 5 MG tablet tablet      • fentaNYL (DURAGESIC) 12 MCG/HR PLACE (1) 25 MCG PATCH AND (1) 12.5 MCG PATCH TO EQUAL 37.5 MCG TO SKIN AS DIRECTED BY PROVIDER EVERY 72 HOURS 10 patch 0   • fentaNYL (DURAGESIC) 25 MCG/HR patch PLACE (1) 25 MCG PATCH AND (1) 12.5 MCG PATCH TO EQUAL 37.5 MCG TO SKIN AS DIRECTED BY PROVIDER EVERY 72 HOURS 10 patch 0   • furosemide (LASIX) 40 MG tablet      • HM Pain Relief Extra Strength 500 MG tablet Take 500 mg by mouth Every 4 (Four) Hours As Needed.     • ibuprofen (ADVIL,MOTRIN) 800 MG tablet Take 800 mg by mouth Every 8 (Eight) Hours As Needed.     • levothyroxine (SYNTHROID, LEVOTHROID) 50 MCG tablet Take 50 mcg by mouth Daily.     • lidocaine-prilocaine (EMLA) 2.5-2.5 % cream Apply 1 application topically to the appropriate area as directed As Needed (prior to port access). 30 g 3   • omeprazole (priLOSEC) 40 MG capsule Take 40 mg by mouth Daily.     • oxyCODONE (Roxicodone) 5 MG immediate release tablet Take 1 tablet by mouth Every 4 (Four) Hours As Needed for Moderate Pain . 60 tablet 0     No current facility-administered medications on file prior to visit.          When did you start taking these medications:3 WEEKS    Which medication are you concerned about: MARU    Who prescribed you this medication: REMI URRUTIA/ CARISA BROWN    What are your concerns: PATIENT IS STILL EXPERIENCING SWELLING AT NIGHT BEFORE BED THAT IS AFFECTING HER BREATHING. PATIENT ASKING IF SHE SHOULD DOUBLE UP ON THE MEDICATION?

## 2021-12-06 NOTE — TELEPHONE ENCOUNTER
FMLA forms dropped off to [facility].  Faxed forms for patients son Arnaldo Yeboah to [facility].  Fax: [phone]

## 2021-12-07 NOTE — TELEPHONE ENCOUNTER
Called patient at this time. Informing her that per Dr. Sultana she could double up on her lasix for a few days. Informing her per him not to wait to late in evening because of the fact she could be up all night.

## 2021-12-07 NOTE — TELEPHONE ENCOUNTER
PT RETURNING MIRELLA'S CALL. TRIED TO W/T PT TO OFFICE BUT WAS UNSUCCESSFUL.     PLS CALL PT TO DISCUSS HER LASIX.    [phone]

## 2021-12-10 ENCOUNTER — OFFICE VISIT (OUTPATIENT)
Dept: ONCOLOGY | Facility: CLINIC | Age: 51
End: 2021-12-10

## 2021-12-10 ENCOUNTER — NURSE NAVIGATOR (OUTPATIENT)
Dept: INFUSION THERAPY | Facility: HOSPITAL | Age: 51
End: 2021-12-10

## 2021-12-10 ENCOUNTER — INFUSION (OUTPATIENT)
Dept: ONCOLOGY | Facility: HOSPITAL | Age: 51
End: 2021-12-10

## 2021-12-10 VITALS
RESPIRATION RATE: 22 BRPM | TEMPERATURE: 97.7 F | WEIGHT: 252 LBS | HEART RATE: 78 BPM | HEIGHT: 64 IN | DIASTOLIC BLOOD PRESSURE: 81 MMHG | SYSTOLIC BLOOD PRESSURE: 120 MMHG | OXYGEN SATURATION: 99 % | BODY MASS INDEX: 43.02 KG/M2

## 2021-12-10 DIAGNOSIS — R60.1 GENERALIZED EDEMA: ICD-10-CM

## 2021-12-10 DIAGNOSIS — C22.1 CHOLANGIOCARCINOMA (HCC): Primary | ICD-10-CM

## 2021-12-10 DIAGNOSIS — Z99.81 OXYGEN DEPENDENT: ICD-10-CM

## 2021-12-10 DIAGNOSIS — R53.1 WEAKNESS: ICD-10-CM

## 2021-12-10 LAB
ALBUMIN SERPL-MCNC: 3.3 G/DL (ref 3.5–5.2)
ALBUMIN/GLOB SERPL: 0.9 G/DL
ALP SERPL-CCNC: 151 U/L (ref 39–117)
ALT SERPL W P-5'-P-CCNC: 21 U/L (ref 1–33)
ANION GAP SERPL CALCULATED.3IONS-SCNC: 11.8 MMOL/L (ref 5–15)
AST SERPL-CCNC: 26 U/L (ref 1–32)
BASOPHILS # BLD AUTO: 0.05 10*3/MM3 (ref 0–0.2)
BASOPHILS NFR BLD AUTO: 0.6 % (ref 0–1.5)
BILIRUB SERPL-MCNC: 0.7 MG/DL (ref 0–1.2)
BUN SERPL-MCNC: 7 MG/DL (ref 6–20)
BUN/CREAT SERPL: 9.7 (ref 7–25)
CALCIUM SPEC-SCNC: 9.2 MG/DL (ref 8.6–10.5)
CANCER AG19-9 SERPL-ACNC: 637.2 U/ML
CHLORIDE SERPL-SCNC: 89 MMOL/L (ref 98–107)
CO2 SERPL-SCNC: 35.2 MMOL/L (ref 22–29)
CREAT SERPL-MCNC: 0.72 MG/DL (ref 0.57–1)
DEPRECATED RDW RBC AUTO: 54 FL (ref 37–54)
EOSINOPHIL # BLD AUTO: 0.08 10*3/MM3 (ref 0–0.4)
EOSINOPHIL NFR BLD AUTO: 0.9 % (ref 0.3–6.2)
ERYTHROCYTE [DISTWIDTH] IN BLOOD BY AUTOMATED COUNT: 15.9 % (ref 12.3–15.4)
GFR SERPL CREATININE-BSD FRML MDRD: 86 ML/MIN/1.73
GLOBULIN UR ELPH-MCNC: 3.8 GM/DL
GLUCOSE SERPL-MCNC: 110 MG/DL (ref 65–99)
HCT VFR BLD AUTO: 38 % (ref 34–46.6)
HGB BLD-MCNC: 12.3 G/DL (ref 12–15.9)
IMM GRANULOCYTES # BLD AUTO: 0.05 10*3/MM3 (ref 0–0.05)
IMM GRANULOCYTES NFR BLD AUTO: 0.6 % (ref 0–0.5)
LYMPHOCYTES # BLD AUTO: 2.39 10*3/MM3 (ref 0.7–3.1)
LYMPHOCYTES NFR BLD AUTO: 28.3 % (ref 19.6–45.3)
MCH RBC QN AUTO: 30.5 PG (ref 26.6–33)
MCHC RBC AUTO-ENTMCNC: 32.4 G/DL (ref 31.5–35.7)
MCV RBC AUTO: 94.3 FL (ref 79–97)
MONOCYTES # BLD AUTO: 0.81 10*3/MM3 (ref 0.1–0.9)
MONOCYTES NFR BLD AUTO: 9.6 % (ref 5–12)
NEUTROPHILS NFR BLD AUTO: 5.06 10*3/MM3 (ref 1.7–7)
NEUTROPHILS NFR BLD AUTO: 60 % (ref 42.7–76)
NRBC BLD AUTO-RTO: 0 /100 WBC (ref 0–0.2)
PLATELET # BLD AUTO: 391 10*3/MM3 (ref 140–450)
PMV BLD AUTO: 9.4 FL (ref 6–12)
POTASSIUM SERPL-SCNC: 3.1 MMOL/L (ref 3.5–5.2)
PROT SERPL-MCNC: 7.1 G/DL (ref 6–8.5)
RBC # BLD AUTO: 4.03 10*6/MM3 (ref 3.77–5.28)
SODIUM SERPL-SCNC: 136 MMOL/L (ref 136–145)
WBC NRBC COR # BLD: 8.44 10*3/MM3 (ref 3.4–10.8)

## 2021-12-10 PROCEDURE — 25010000002 PACLITAXEL PROTEIN-BOUND PART PER 1 MG: Performed by: INTERNAL MEDICINE

## 2021-12-10 PROCEDURE — 25010000002 DEXAMETHASONE PER 1 MG: Performed by: INTERNAL MEDICINE

## 2021-12-10 PROCEDURE — 36415 COLL VENOUS BLD VENIPUNCTURE: CPT

## 2021-12-10 PROCEDURE — 85025 COMPLETE CBC W/AUTO DIFF WBC: CPT

## 2021-12-10 PROCEDURE — 25010000002 GEMCITABINE 1 GM/26.3ML SOLUTION 26.3 ML VIAL: Performed by: INTERNAL MEDICINE

## 2021-12-10 PROCEDURE — 80053 COMPREHEN METABOLIC PANEL: CPT

## 2021-12-10 PROCEDURE — 96413 CHEMO IV INFUSION 1 HR: CPT

## 2021-12-10 PROCEDURE — 96376 TX/PRO/DX INJ SAME DRUG ADON: CPT

## 2021-12-10 PROCEDURE — 96417 CHEMO IV INFUS EACH ADDL SEQ: CPT

## 2021-12-10 PROCEDURE — 96367 TX/PROPH/DG ADDL SEQ IV INF: CPT

## 2021-12-10 PROCEDURE — 96375 TX/PRO/DX INJ NEW DRUG ADDON: CPT

## 2021-12-10 PROCEDURE — 25010000002 GEMCITABINE 200 MG/5.26ML SOLUTION 5.26 ML VIAL: Performed by: INTERNAL MEDICINE

## 2021-12-10 PROCEDURE — 99215 OFFICE O/P EST HI 40 MIN: CPT | Performed by: NURSE PRACTITIONER

## 2021-12-10 PROCEDURE — 86301 IMMUNOASSAY TUMOR CA 19-9: CPT

## 2021-12-10 PROCEDURE — 25010000002 HEPARIN LOCK FLUSH PER 10 UNITS: Performed by: INTERNAL MEDICINE

## 2021-12-10 PROCEDURE — 25010000002 ALTEPLASE 2 MG RECONSTITUTED SOLUTION: Performed by: NURSE PRACTITIONER

## 2021-12-10 RX ORDER — PROCHLORPERAZINE MALEATE 10 MG
10 TABLET ORAL EVERY 6 HOURS PRN
Qty: 60 TABLET | Refills: 5 | Status: SHIPPED | OUTPATIENT
Start: 2021-12-10

## 2021-12-10 RX ORDER — WATER 1000 ML/1000ML
INJECTION, SOLUTION INTRAVENOUS
Status: COMPLETED
Start: 2021-12-10 | End: 2021-12-10

## 2021-12-10 RX ORDER — SODIUM CHLORIDE 9 MG/ML
250 INJECTION, SOLUTION INTRAVENOUS ONCE
Status: DISCONTINUED | OUTPATIENT
Start: 2021-12-10 | End: 2021-12-10 | Stop reason: HOSPADM

## 2021-12-10 RX ORDER — SODIUM CHLORIDE 0.9 % (FLUSH) 0.9 %
10 SYRINGE (ML) INJECTION AS NEEDED
Status: DISCONTINUED | OUTPATIENT
Start: 2021-12-10 | End: 2021-12-10 | Stop reason: HOSPADM

## 2021-12-10 RX ORDER — HEPARIN SODIUM (PORCINE) LOCK FLUSH IV SOLN 100 UNIT/ML 100 UNIT/ML
500 SOLUTION INTRAVENOUS AS NEEDED
Status: DISCONTINUED | OUTPATIENT
Start: 2021-12-10 | End: 2021-12-10 | Stop reason: HOSPADM

## 2021-12-10 RX ORDER — POTASSIUM CHLORIDE 20 MEQ/1
20 TABLET, EXTENDED RELEASE ORAL 2 TIMES DAILY
Qty: 28 TABLET | Refills: 0 | Status: SHIPPED | OUTPATIENT
Start: 2021-12-10

## 2021-12-10 RX ORDER — HEPARIN SODIUM (PORCINE) LOCK FLUSH IV SOLN 100 UNIT/ML 100 UNIT/ML
500 SOLUTION INTRAVENOUS AS NEEDED
Status: CANCELLED | OUTPATIENT
Start: 2021-12-10

## 2021-12-10 RX ORDER — SODIUM CHLORIDE 0.9 % (FLUSH) 0.9 %
10 SYRINGE (ML) INJECTION AS NEEDED
Status: CANCELLED | OUTPATIENT
Start: 2021-12-10

## 2021-12-10 RX ORDER — PACLITAXEL 100 MG/20ML
125 INJECTION, POWDER, LYOPHILIZED, FOR SUSPENSION INTRAVENOUS ONCE
Status: COMPLETED | OUTPATIENT
Start: 2021-12-10 | End: 2021-12-10

## 2021-12-10 RX ADMIN — SODIUM CHLORIDE, PRESERVATIVE FREE 10 ML: 5 INJECTION INTRAVENOUS at 14:11

## 2021-12-10 RX ADMIN — WATER 10 ML: 1 INJECTION INTRAMUSCULAR; INTRAVENOUS; SUBCUTANEOUS at 11:37

## 2021-12-10 RX ADMIN — ALTEPLASE: 2.2 INJECTION, POWDER, LYOPHILIZED, FOR SOLUTION INTRAVENOUS at 11:37

## 2021-12-10 RX ADMIN — WATER 10 ML: 1 INJECTION INTRAMUSCULAR; INTRAVENOUS; SUBCUTANEOUS at 10:23

## 2021-12-10 RX ADMIN — GEMCITABINE HYDROCHLORIDE 2250 MG: 1 INJECTION, SOLUTION INTRAVENOUS at 13:41

## 2021-12-10 RX ADMIN — ALTEPLASE: 2.2 INJECTION, POWDER, LYOPHILIZED, FOR SOLUTION INTRAVENOUS at 10:23

## 2021-12-10 RX ADMIN — PACLITAXEL 285 MG: 100 INJECTION, POWDER, LYOPHILIZED, FOR SUSPENSION INTRAVENOUS at 12:55

## 2021-12-10 RX ADMIN — DEXAMETHASONE SODIUM PHOSPHATE 12 MG: 4 INJECTION, SOLUTION INTRA-ARTICULAR; INTRALESIONAL; INTRAMUSCULAR; INTRAVENOUS; SOFT TISSUE at 12:28

## 2021-12-10 RX ADMIN — HEPARIN 500 UNITS: 100 SYRINGE at 14:11

## 2021-12-10 NOTE — PROGRESS NOTES
PROBLEM LIST:  Oncology/Hematology History   Cholangiocarcinoma (HCC)   10/29/2021 Initial Diagnosis    Cholangiocarcinoma (HCC)     11/1/2021 Imaging    Impression  Performed by IMAGING  There is extensive mediastinal lymphadenopathy with numerous enlarged hypodense nodes especially involving the right paratracheal, precarinal and subcarinal regions. The low-attenuation may reflect the presence of necrosis.     There are also prominent epicardial nodes.     There are number of very small peripheral nodules bilaterally as described above. Recommend attention to these small nodules on subsequent studies. No infiltrates or effusions.   Faint poorly defined mass involving the medial aspect of the liver-image 34, series 5. This is suspicious for neoplasm. A smaller hypodensity is noted superiorly and anteriorly in the right lobe.     There is periportal, portacaval and bulky retroperitoneal lymphadenopathy. Many of the nodes appears hypodense suggesting possible necrosis.     There is a 3.5 x 4.3 cm hypodensity involving the left pelvis adjacent to the uterus. Possible left adnexal cyst. Recommend further evaluation with ultrasound. This is larger than on 10/19/2021.        11/1/2021 Biopsy    Final Diagnosis     A. LEFT HEPATIC DUCT STRICTURE, BIOPSY:      -  POORLY DIFFERENTIATED ADENOCARCINOMA.        11/5/2021 -  Chemotherapy    OP PANCREATIC PACLitaxel Protein-Bound / Gemcitabine         REASON FOR VISIT: Management of my cholangiocarcinoma    HISTORY OF PRESENT ILLNESS:   51 y.o.  female presents today for follow-up of her cholangiocarcinoma.  She started chemotherapy with Abraxane and gemcitabine. She is here today for cycle #2.    Pain is controlled with fentanyl patch increase. She has not had to use any prn oxycodone. She is complaining of fatigue. She says she has nausea and taste changes. She reports the zofran is working but has lost weight.  Appetite has been poor. She is unsure about treatment.     Past  medical history, social history and family history was reviewed 12/10/21 and unchanged from prior visit.    Review of Systems:    Review of Systems   Constitutional: Positive for appetite change and fatigue.   HENT:  Negative.    Eyes: Negative.    Respiratory: Negative.    Cardiovascular: Negative.    Gastrointestinal: Positive for abdominal distention.   Endocrine: Negative.    Musculoskeletal: Negative.    Skin: Negative.    Hematological: Negative.    Psychiatric/Behavioral: The patient is nervous/anxious.             Medications:        Current Outpatient Medications:   •  Eliquis 5 MG tablet tablet, , Disp: , Rfl:   •  fentaNYL (DURAGESIC) 12 MCG/HR, PLACE (1) 25 MCG PATCH AND (1) 12.5 MCG PATCH TO EQUAL 37.5 MCG TO SKIN AS DIRECTED BY PROVIDER EVERY 72 HOURS, Disp: 10 patch, Rfl: 0  •  fentaNYL (DURAGESIC) 25 MCG/HR patch, PLACE (1) 25 MCG PATCH AND (1) 12.5 MCG PATCH TO EQUAL 37.5 MCG TO SKIN AS DIRECTED BY PROVIDER EVERY 72 HOURS, Disp: 10 patch, Rfl: 0  •  furosemide (LASIX) 40 MG tablet, , Disp: , Rfl:   •  HM Pain Relief Extra Strength 500 MG tablet, Take 500 mg by mouth Every 4 (Four) Hours As Needed., Disp: , Rfl:   •  ibuprofen (ADVIL,MOTRIN) 800 MG tablet, Take 800 mg by mouth Every 8 (Eight) Hours As Needed., Disp: , Rfl:   •  levothyroxine (SYNTHROID, LEVOTHROID) 50 MCG tablet, Take 50 mcg by mouth Daily., Disp: , Rfl:   •  lidocaine-prilocaine (EMLA) 2.5-2.5 % cream, Apply 1 application topically to the appropriate area as directed As Needed (prior to port access)., Disp: 30 g, Rfl: 3  •  omeprazole (priLOSEC) 40 MG capsule, Take 40 mg by mouth Daily., Disp: , Rfl:   •  oxyCODONE (Roxicodone) 5 MG immediate release tablet, Take 1 tablet by mouth Every 4 (Four) Hours As Needed for Moderate Pain ., Disp: 60 tablet, Rfl: 0  •  prochlorperazine (COMPAZINE) 10 MG tablet, Take 1 tablet by mouth Every 6 (Six) Hours As Needed for Nausea or Vomiting., Disp: 60 tablet, Rfl: 5  No current  "facility-administered medications for this visit.    Facility-Administered Medications Ordered in Other Visits:   •  alteplase (CATHFLO/ACTIVASE) injection 2 mg, 2 mg, Intravenous, Once, Yue Marshall APRN  •  heparin injection 500 Units, 500 Units, Intravenous, PRN, Ramon Lubin MD  •  sodium chloride 0.9 % flush 10 mL, 10 mL, Intravenous, PRN, Ramon Lubin MD    Pain Medications             fentaNYL (DURAGESIC) 12 MCG/HR PLACE (1) 25 MCG PATCH AND (1) 12.5 MCG PATCH TO EQUAL 37.5 MCG TO SKIN AS DIRECTED BY PROVIDER EVERY 72 HOURS    fentaNYL (DURAGESIC) 25 MCG/HR patch PLACE (1) 25 MCG PATCH AND (1) 12.5 MCG PATCH TO EQUAL 37.5 MCG TO SKIN AS DIRECTED BY PROVIDER EVERY 72 HOURS    HM Pain Relief Extra Strength 500 MG tablet Take 500 mg by mouth Every 4 (Four) Hours As Needed.    ibuprofen (ADVIL,MOTRIN) 800 MG tablet Take 800 mg by mouth Every 8 (Eight) Hours As Needed.    oxyCODONE (Roxicodone) 5 MG immediate release tablet Take 1 tablet by mouth Every 4 (Four) Hours As Needed for Moderate Pain .    prochlorperazine (COMPAZINE) 10 MG tablet Take 1 tablet by mouth Every 6 (Six) Hours As Needed for Nausea or Vomiting.             ALLERGIES:    Allergies   Allergen Reactions   • Sumatriptan Headache         Physical Exam    VITAL SIGNS:  /81   Pulse 78   Temp 97.7 °F (36.5 °C)   Resp 22   Ht 162.6 cm (64\")   Wt 114 kg (252 lb)   SpO2 99% Comment: O2@3lpnc  BMI 43.26 kg/m²     ECOG score: 2           Wt Readings from Last 3 Encounters:   12/10/21 114 kg (252 lb)   12/03/21 122 kg (268 lb)   11/19/21 124 kg (273 lb 3.2 oz)       Body mass index is 43.26 kg/m². Body surface area is 2.16 meters squared.    Pain Score    12/10/21 0849   PainSc: 0-No pain           Performance Status: 1    General: well appearing female in no acute distress  Neuro: alert and oriented  HEENT: sclera anicteric, oropharynx clear  Lymphatics: no cervical, supraclavicular, or axillary " adenopathy  Cardiovascular: regular rate and rhythm, no murmurs  Lungs: clear to auscultation bilaterally  Abdomen: soft, nontender, nondistended.  No palpable organomegaly  Extremeties: no lower extremity edema  Skin: no rashes, lesions, bruising, or petechiae  Psych: mood and affect appropriate        RECENT LABS:    Lab Results   Component Value Date    HGB 11.8 (L) 12/03/2021    HCT 37.0 12/03/2021    MCV 94.6 12/03/2021     12/03/2021    WBC 12.89 (H) 12/03/2021    NEUTROABS 9.84 (H) 12/03/2021    LYMPHSABS 1.34 12/03/2021    MONOSABS 1.08 (H) 12/03/2021    EOSABS 0.01 12/03/2021    BASOSABS 0.03 12/03/2021       Lab Results   Component Value Date    GLUCOSE 135 (H) 12/03/2021    BUN 12 12/03/2021    CREATININE 0.69 12/03/2021     (L) 12/03/2021    K 3.5 12/03/2021    CL 91 (L) 12/03/2021    CO2 33.4 (H) 12/03/2021    CALCIUM 9.0 12/03/2021    PROTEINTOT 6.7 12/03/2021    ALBUMIN 2.90 (L) 12/03/2021    BILITOT 0.6 12/03/2021    ALKPHOS 179 (H) 12/03/2021    AST 48 (H) 12/03/2021    ALT 36 (H) 12/03/2021         No results found for:     Assessment/Plan    1.  Metastatic cholangiocarcinoma.  Caris testing confirmed the diagnosis. Unfortunately,  failed to reveal any targetable mutation.     We will continue with cycle #2 Abraxane and gemcitabine today. She continues to recover from recent hospitalization and PE.  She will follow up in one month for cycle #3 with scans prior to return.     I had a long discussion with the patient concerning chemotherapy. We discussed that treatment is ultimately her decision. However, if we do not treat the cancer we will continue to grow and the hospice would be an option for her. For now she would like to try the second cycle. She will call us with any concerns.    2. Cancer related pain.Improved. We will cotninue fentanyl patch at 37.5mcg. We will continue with oxycodone to 5mg every 4 hours as needed for pain.    3.  Morbid obesity.  This complicates dosing  of her chemotherapy and managing toxicity.    4. Bruising. Improved. We will check cbc for platelet count today.     5. Fatigue. We will check labs to see if HGB is less than 8. If so we will transfuse with 2 units PRBC.     6. Chemotherapy related nausea. We had a long discussion about the importance of eating and drinking adequately. I will send in Compazine for nausea. We discussed how to take Compazine and Zofran alternating.    7.Weakness. Patient is unable to walk long distances due to chemotherapy, oxygen dependence, fatigue, and swelling. She was unable to use cane and she needs a wheelchair to help with long distances.     8.PE. Patient was hospitalized after day #15 of cycle #1 due to PE. She will continue on 2L of oxygen.     Total time of patient care including time prior to, face to face with patient, and following visit spent in reviewing records, lab results, imaging studies, discussion with patient, and documentation/charting was > 42 minutes           ANGELIQUE Kelley  [facility]    Return in (Approximately): 1 month    Orders Placed This Encounter   Procedures   • CT Chest With Contrast Diagnostic   • CT Abdomen Pelvis With Contrast       12/10/2021

## 2021-12-10 NOTE — PROGRESS NOTES
Faxed order to [facility] in [place], [place] for patient to obtain a wheelchair. Pt. Notified that the order was faxed.

## 2021-12-13 ENCOUNTER — TELEPHONE (OUTPATIENT)
Dept: NUTRITION | Facility: HOSPITAL | Age: 51
End: 2021-12-13

## 2021-12-13 ENCOUNTER — TELEPHONE (OUTPATIENT)
Dept: ONCOLOGY | Facility: CLINIC | Age: 51
End: 2021-12-13

## 2021-12-13 DIAGNOSIS — E86.0 DEHYDRATION: ICD-10-CM

## 2021-12-13 DIAGNOSIS — T45.1X5A CHEMOTHERAPY INDUCED NAUSEA AND VOMITING: Primary | ICD-10-CM

## 2021-12-13 DIAGNOSIS — R11.2 CHEMOTHERAPY INDUCED NAUSEA AND VOMITING: Primary | ICD-10-CM

## 2021-12-13 DIAGNOSIS — C22.1 CHOLANGIOCARCINOMA (HCC): ICD-10-CM

## 2021-12-13 NOTE — PROGRESS NOTES
Nutrition Services    Patient Name:  Enedina Yeboah  YOB: 1970  MRN: [id number]  Admit Date:  (Not on file)    RD spoke with pt over the phone for nutrition oncology f/u. Pt states that she has been experiencing nausea. She is currently taking an antiemetic and it does help alleviate symptoms. RD discussed ways to alleviate nausea and will mail pt additional information from [employer] as well as Boost and Ensure coupons. Pt expressed no other concerns at this time. Encouraged pt to reach out with any questions or concerns.       Electronically signed by:  Laurel Lau RD  12/13/21 14:42 EST

## 2021-12-13 NOTE — TELEPHONE ENCOUNTER
Called patient to see if she needed any nausea medication or iv fluids today. Patient reporting that she is trying to eat and wanted to see how she does. Informing patient to call and notify nurse if she feels like she needs iv fluids. Patient stating she will call if she has any issues.

## 2021-12-14 ENCOUNTER — INFUSION (OUTPATIENT)
Dept: ONCOLOGY | Facility: HOSPITAL | Age: 51
End: 2021-12-14

## 2021-12-14 ENCOUNTER — OFFICE VISIT (OUTPATIENT)
Dept: ONCOLOGY | Facility: CLINIC | Age: 51
End: 2021-12-14

## 2021-12-14 VITALS
SYSTOLIC BLOOD PRESSURE: 115 MMHG | TEMPERATURE: 98 F | HEART RATE: 104 BPM | BODY MASS INDEX: 44.2 KG/M2 | RESPIRATION RATE: 18 BRPM | WEIGHT: 257.5 LBS | DIASTOLIC BLOOD PRESSURE: 55 MMHG | OXYGEN SATURATION: 98 %

## 2021-12-14 VITALS
OXYGEN SATURATION: 98 % | HEART RATE: 104 BPM | DIASTOLIC BLOOD PRESSURE: 55 MMHG | RESPIRATION RATE: 18 BRPM | SYSTOLIC BLOOD PRESSURE: 115 MMHG | TEMPERATURE: 98 F

## 2021-12-14 DIAGNOSIS — E83.51 HYPOCALCEMIA: ICD-10-CM

## 2021-12-14 DIAGNOSIS — C22.1 CHOLANGIOCARCINOMA (HCC): ICD-10-CM

## 2021-12-14 DIAGNOSIS — T45.1X5A CHEMOTHERAPY INDUCED NAUSEA AND VOMITING: ICD-10-CM

## 2021-12-14 DIAGNOSIS — R11.2 CHEMOTHERAPY INDUCED NAUSEA AND VOMITING: ICD-10-CM

## 2021-12-14 DIAGNOSIS — E86.0 DEHYDRATION: ICD-10-CM

## 2021-12-14 DIAGNOSIS — E87.1 HYPONATREMIA: ICD-10-CM

## 2021-12-14 DIAGNOSIS — T45.1X5A CHEMOTHERAPY INDUCED NAUSEA AND VOMITING: Primary | ICD-10-CM

## 2021-12-14 DIAGNOSIS — C22.1 CHOLANGIOCARCINOMA (HCC): Primary | ICD-10-CM

## 2021-12-14 DIAGNOSIS — R11.2 CHEMOTHERAPY INDUCED NAUSEA AND VOMITING: Primary | ICD-10-CM

## 2021-12-14 LAB
ALBUMIN SERPL-MCNC: 2.9 G/DL (ref 3.5–5.2)
ALBUMIN/GLOB SERPL: 0.8 G/DL
ALP SERPL-CCNC: 133 U/L (ref 39–117)
ALT SERPL W P-5'-P-CCNC: 20 U/L (ref 1–33)
ANION GAP SERPL CALCULATED.3IONS-SCNC: 10.3 MMOL/L (ref 5–15)
AST SERPL-CCNC: 23 U/L (ref 1–32)
BILIRUB SERPL-MCNC: 1.2 MG/DL (ref 0–1.2)
BUN SERPL-MCNC: 10 MG/DL (ref 6–20)
BUN/CREAT SERPL: 16.1 (ref 7–25)
CALCIUM SPEC-SCNC: 8.4 MG/DL (ref 8.6–10.5)
CHLORIDE SERPL-SCNC: 85 MMOL/L (ref 98–107)
CO2 SERPL-SCNC: 31.7 MMOL/L (ref 22–29)
CREAT SERPL-MCNC: 0.62 MG/DL (ref 0.57–1)
GFR SERPL CREATININE-BSD FRML MDRD: 102 ML/MIN/1.73
GLOBULIN UR ELPH-MCNC: 3.6 GM/DL
GLUCOSE SERPL-MCNC: 132 MG/DL (ref 65–99)
POTASSIUM SERPL-SCNC: 3.2 MMOL/L (ref 3.5–5.2)
PROT SERPL-MCNC: 6.5 G/DL (ref 6–8.5)
SODIUM SERPL-SCNC: 127 MMOL/L (ref 136–145)

## 2021-12-14 PROCEDURE — 96361 HYDRATE IV INFUSION ADD-ON: CPT

## 2021-12-14 PROCEDURE — 25010000002 ONDANSETRON PER 1 MG: Performed by: NURSE PRACTITIONER

## 2021-12-14 PROCEDURE — 25010000002 HEPARIN LOCK FLUSH PER 10 UNITS

## 2021-12-14 PROCEDURE — 96365 THER/PROPH/DIAG IV INF INIT: CPT

## 2021-12-14 PROCEDURE — 36415 COLL VENOUS BLD VENIPUNCTURE: CPT

## 2021-12-14 PROCEDURE — 96360 HYDRATION IV INFUSION INIT: CPT

## 2021-12-14 PROCEDURE — 25010000002 DEXAMETHASONE PER 1 MG: Performed by: NURSE PRACTITIONER

## 2021-12-14 PROCEDURE — 99215 OFFICE O/P EST HI 40 MIN: CPT | Performed by: NURSE PRACTITIONER

## 2021-12-14 PROCEDURE — 96375 TX/PRO/DX INJ NEW DRUG ADDON: CPT

## 2021-12-14 PROCEDURE — 80053 COMPREHEN METABOLIC PANEL: CPT

## 2021-12-14 RX ORDER — HEPARIN SODIUM (PORCINE) LOCK FLUSH IV SOLN 100 UNIT/ML 100 UNIT/ML
SOLUTION INTRAVENOUS
Status: COMPLETED
Start: 2021-12-14 | End: 2021-12-14

## 2021-12-14 RX ORDER — SODIUM CHLORIDE 0.9 % (FLUSH) 0.9 %
10 SYRINGE (ML) INJECTION AS NEEDED
Status: DISCONTINUED | OUTPATIENT
Start: 2021-12-14 | End: 2021-12-14 | Stop reason: HOSPADM

## 2021-12-14 RX ORDER — HEPARIN SODIUM (PORCINE) LOCK FLUSH IV SOLN 100 UNIT/ML 100 UNIT/ML
500 SOLUTION INTRAVENOUS AS NEEDED
Status: CANCELLED | OUTPATIENT
Start: 2021-12-14

## 2021-12-14 RX ORDER — SODIUM CHLORIDE 0.9 % (FLUSH) 0.9 %
10 SYRINGE (ML) INJECTION AS NEEDED
Status: CANCELLED | OUTPATIENT
Start: 2021-12-14

## 2021-12-14 RX ORDER — HEPARIN SODIUM (PORCINE) LOCK FLUSH IV SOLN 100 UNIT/ML 100 UNIT/ML
500 SOLUTION INTRAVENOUS AS NEEDED
Status: DISCONTINUED | OUTPATIENT
Start: 2021-12-14 | End: 2021-12-14 | Stop reason: HOSPADM

## 2021-12-14 RX ADMIN — ONDANSETRON: 2 SOLUTION INTRAMUSCULAR; INTRAVENOUS at 11:38

## 2021-12-14 RX ADMIN — SODIUM CHLORIDE 1000 ML: 9 INJECTION, SOLUTION INTRAVENOUS at 11:35

## 2021-12-14 RX ADMIN — HEPARIN 500 UNITS: 100 SYRINGE at 13:14

## 2021-12-14 RX ADMIN — HEPARIN SODIUM (PORCINE) LOCK FLUSH IV SOLN 100 UNIT/ML 500 UNITS: 100 SOLUTION at 13:14

## 2021-12-14 NOTE — TELEPHONE ENCOUNTER
Spoke with patient.  She continues to c/o nausea and vomiting.  States she is taking zofran and compazine and trying to alternate.  She is having difficulty keeping her medication down long enough for it to work.  Urine is dark and she also c/o fatigue and weakness.  Denies fever, cough or any other symptoms.      Discussed with ANGELIQUE Wright.  Will bring patient in for IVF's with 1LNS, zofran 12 mg IV and dexamethasone 8 mg IV.  Will also check CMP.  Patient reports that she will be out of lasix soon and will need a refill.   Yue ask that the infusion nurse notify her when the patient arrives to assess swelling.  Mina notified in the infusion center.  Appointment given for today at 10:30.  Patient notified and verbalized understanding.

## 2021-12-14 NOTE — TELEPHONE ENCOUNTER
FRANCISCO IS CALLING BACK THIS MORNING STATES SHE IS STILL SICK AND WOULD LIKE TO COME IN FOR IV FLUIDS TODAY.    PLEASE ADVISE

## 2021-12-14 NOTE — PROGRESS NOTES
PROBLEM LIST:  Oncology/Hematology History   Cholangiocarcinoma (HCC)   10/29/2021 Initial Diagnosis    Cholangiocarcinoma (HCC)     11/1/2021 Imaging    Impression  Performed by IMAGING  There is extensive mediastinal lymphadenopathy with numerous enlarged hypodense nodes especially involving the right paratracheal, precarinal and subcarinal regions. The low-attenuation may reflect the presence of necrosis.     There are also prominent epicardial nodes.     There are number of very small peripheral nodules bilaterally as described above. Recommend attention to these small nodules on subsequent studies. No infiltrates or effusions.   Faint poorly defined mass involving the medial aspect of the liver-image 34, series 5. This is suspicious for neoplasm. A smaller hypodensity is noted superiorly and anteriorly in the right lobe.     There is periportal, portacaval and bulky retroperitoneal lymphadenopathy. Many of the nodes appears hypodense suggesting possible necrosis.     There is a 3.5 x 4.3 cm hypodensity involving the left pelvis adjacent to the uterus. Possible left adnexal cyst. Recommend further evaluation with ultrasound. This is larger than on 10/19/2021.        11/1/2021 Biopsy    Final Diagnosis     A. LEFT HEPATIC DUCT STRICTURE, BIOPSY:      -  POORLY DIFFERENTIATED ADENOCARCINOMA.        11/5/2021 -  Chemotherapy    OP PANCREATIC PACLitaxel Protein-Bound / Gemcitabine     12/14/2021 -  Chemotherapy    OP SUPPORTIVE HYDRATION + ANTIEMETICS         REASON FOR VISIT: Management of my cholangiocarcinoma    HISTORY OF PRESENT ILLNESS:   51 y.o.  female presents today for acute visit of her cholangiocarcinoma.  She had day #1 cycle #2  chemotherapy with Abraxane and gemcitabine.  Pain is controlled with fentanyl patch increase. She has not had to use any prn oxycodone.  She has been unable to eat or drink. She says that she took her lasix last night because of swelling. Food tastes terrible so she just  doesn't eat. Nausea is worse. She is taking zofran every 4 hours and compazine every 6. She requested fluids today due to how bad she is feeling.     Past medical history, social history and family history was reviewed 12/14/21 and unchanged from prior visit.    Review of Systems:    Review of Systems   Constitutional: Positive for appetite change and fatigue.   HENT:  Negative.    Eyes: Negative.    Respiratory: Negative.    Cardiovascular: Negative.    Gastrointestinal: Positive for abdominal distention.   Endocrine: Negative.    Musculoskeletal: Negative.    Skin: Negative.    Hematological: Negative.    Psychiatric/Behavioral: The patient is nervous/anxious.             Medications:        Current Outpatient Medications:   •  Eliquis 5 MG tablet tablet, , Disp: , Rfl:   •  fentaNYL (DURAGESIC) 12 MCG/HR, PLACE (1) 25 MCG PATCH AND (1) 12.5 MCG PATCH TO EQUAL 37.5 MCG TO SKIN AS DIRECTED BY PROVIDER EVERY 72 HOURS, Disp: 10 patch, Rfl: 0  •  fentaNYL (DURAGESIC) 25 MCG/HR patch, PLACE (1) 25 MCG PATCH AND (1) 12.5 MCG PATCH TO EQUAL 37.5 MCG TO SKIN AS DIRECTED BY PROVIDER EVERY 72 HOURS, Disp: 10 patch, Rfl: 0  •  furosemide (LASIX) 40 MG tablet, , Disp: , Rfl:   •  HM Pain Relief Extra Strength 500 MG tablet, Take 500 mg by mouth Every 4 (Four) Hours As Needed., Disp: , Rfl:   •  ibuprofen (ADVIL,MOTRIN) 800 MG tablet, Take 800 mg by mouth Every 8 (Eight) Hours As Needed., Disp: , Rfl:   •  levothyroxine (SYNTHROID, LEVOTHROID) 50 MCG tablet, Take 50 mcg by mouth Daily., Disp: , Rfl:   •  lidocaine-prilocaine (EMLA) 2.5-2.5 % cream, Apply 1 application topically to the appropriate area as directed As Needed (prior to port access)., Disp: 30 g, Rfl: 3  •  omeprazole (priLOSEC) 40 MG capsule, Take 40 mg by mouth Daily., Disp: , Rfl:   •  oxyCODONE (Roxicodone) 5 MG immediate release tablet, Take 1 tablet by mouth Every 4 (Four) Hours As Needed for Moderate Pain ., Disp: 60 tablet, Rfl: 0  •  potassium chloride  (K-DUR,KLOR-CON) 20 MEQ CR tablet, Take 1 tablet by mouth 2 (Two) Times a Day., Disp: 28 tablet, Rfl: 0  •  prochlorperazine (COMPAZINE) 10 MG tablet, Take 1 tablet by mouth Every 6 (Six) Hours As Needed for Nausea or Vomiting., Disp: 60 tablet, Rfl: 5  No current facility-administered medications for this visit.    Pain Medications             fentaNYL (DURAGESIC) 12 MCG/HR PLACE (1) 25 MCG PATCH AND (1) 12.5 MCG PATCH TO EQUAL 37.5 MCG TO SKIN AS DIRECTED BY PROVIDER EVERY 72 HOURS    fentaNYL (DURAGESIC) 25 MCG/HR patch PLACE (1) 25 MCG PATCH AND (1) 12.5 MCG PATCH TO EQUAL 37.5 MCG TO SKIN AS DIRECTED BY PROVIDER EVERY 72 HOURS    HM Pain Relief Extra Strength 500 MG tablet Take 500 mg by mouth Every 4 (Four) Hours As Needed.    ibuprofen (ADVIL,MOTRIN) 800 MG tablet Take 800 mg by mouth Every 8 (Eight) Hours As Needed.    oxyCODONE (Roxicodone) 5 MG immediate release tablet Take 1 tablet by mouth Every 4 (Four) Hours As Needed for Moderate Pain .    prochlorperazine (COMPAZINE) 10 MG tablet Take 1 tablet by mouth Every 6 (Six) Hours As Needed for Nausea or Vomiting.             ALLERGIES:    Allergies   Allergen Reactions   • Sumatriptan Headache         Physical Exam    VITAL SIGNS:  /55   Pulse 104   Temp 98 °F (36.7 °C)   Resp 18   SpO2 98%                 Wt Readings from Last 3 Encounters:   12/14/21 117 kg (257 lb 8 oz)   12/10/21 114 kg (252 lb)   12/03/21 122 kg (268 lb)       There is no height or weight on file to calculate BMI. There is no height or weight on file to calculate BSA.           Performance Status: 1    General: well appearing female in no acute distress  Neuro: alert and oriented  HEENT: sclera anicteric, oropharynx clear  Lymphatics: no cervical, supraclavicular, or axillary adenopathy  Cardiovascular: regular rate and rhythm, no murmurs  Lungs: clear to auscultation bilaterally  Abdomen: soft, nontender, nondistended.  No palpable organomegaly  Extremeties: no lower  extremity edema  Skin: no rashes, lesions, bruising, or petechiae  Psych: mood and affect appropriate        RECENT LABS:    Lab Results   Component Value Date    HGB 12.3 12/10/2021    HCT 38.0 12/10/2021    MCV 94.3 12/10/2021     12/10/2021    WBC 8.44 12/10/2021    NEUTROABS 5.06 12/10/2021    LYMPHSABS 2.39 12/10/2021    MONOSABS 0.81 12/10/2021    EOSABS 0.08 12/10/2021    BASOSABS 0.05 12/10/2021       Lab Results   Component Value Date    GLUCOSE 132 (H) 12/14/2021    BUN 10 12/14/2021    CREATININE 0.62 12/14/2021     (L) 12/14/2021    K 3.2 (L) 12/14/2021    CL 85 (L) 12/14/2021    CO2 31.7 (H) 12/14/2021    CALCIUM 8.4 (L) 12/14/2021    PROTEINTOT 6.5 12/14/2021    ALBUMIN 2.90 (L) 12/14/2021    BILITOT 1.2 12/14/2021    ALKPHOS 133 (H) 12/14/2021    AST 23 12/14/2021    ALT 20 12/14/2021         Lab Results   Component Value Date     637.2 (H) 12/10/2021       Assessment/Plan    1.  Metastatic cholangiocarcinoma.  Caris testing confirmed the diagnosis. Unfortunately,  failed to reveal any targetable mutation.     Completed cycle #2 day 1 Abraxane and gemcitabine today. She is here today with complaints of nausea, vomiting, and decreased appetite.       2. Cancer related pain. Improved. We will continue fentanyl patch at 37.5mcg. We will refill 25mcg patch today. We will continue with oxycodone to 5mg every 4 hours as needed for pain.    3. Chemotherapy related nausea. I saw her in infusion today and she has not been taking her nausea medicine as prescribed. We had a long discussion and instructions written for patient. She will alternate zofran and compazine where she is taking one medication every 4 hours. We will see her tomorrow in infusion if nausea has not improved we will start her on zyprexa.     4.Weakness. Patient is unable to walk long distances due to chemotherapy, oxygen dependence, and swelling. She needs a wheelchair to help with long distances. We had a long discussion  concerning appetite. I recommended to the patient that she start Boost breeze since she is not eating. We also discussed to try 6 small meals to help with nausea. I also recommended trying ginger ale or ginger cookies as snacks to help with nausea.     5.PE. Patient was hospitalized after day #15 of cycle #1 due to PE. She will continue on 2L of oxygen.     6. Lower extremity edema. I recommended to that patient to try compression stockings to help with swelling.     Addendum: I discussed the case with Dr. Sultana. We will plan for 3 days of NS to help with sodium level of 127. We will reevaluate if zofran is needed on days 2 and 3 of fluids. We will hold lasix for now. Potassium level is also low. Patient is not taking her potassium. We had a long discussion to start taking potassium to help with her level. I also recommended patient to start taking calcium 1200mg for hypocalcemia.       Total time of patient care including time prior to, face to face with patient, and following visit spent in reviewing records, lab results, imaging studies, discussion with patient, and documentation/charting was > 45 minutes           ANGELIQUE Kelley  [facility]         No orders of the defined types were placed in this encounter.      12/14/2021

## 2021-12-15 ENCOUNTER — HOSPITAL ENCOUNTER (OUTPATIENT)
Dept: INFUSION THERAPY | Facility: HOSPITAL | Age: 51
Setting detail: INFUSION SERIES
Discharge: HOME | End: 2021-12-15

## 2021-12-15 VITALS
OXYGEN SATURATION: 100 % | TEMPERATURE: 97.3 F | DIASTOLIC BLOOD PRESSURE: 86 MMHG | HEART RATE: 88 BPM | SYSTOLIC BLOOD PRESSURE: 130 MMHG | RESPIRATION RATE: 16 BRPM

## 2021-12-15 DIAGNOSIS — R11.2 CHEMOTHERAPY INDUCED NAUSEA AND VOMITING: ICD-10-CM

## 2021-12-15 DIAGNOSIS — E86.0 DEHYDRATION: ICD-10-CM

## 2021-12-15 DIAGNOSIS — C22.1 CHOLANGIOCARCINOMA (HCC): Primary | ICD-10-CM

## 2021-12-15 DIAGNOSIS — C22.1 CHOLANGIOCARCINOMA (HCC): ICD-10-CM

## 2021-12-15 DIAGNOSIS — G89.3 NEOPLASM RELATED PAIN: Primary | ICD-10-CM

## 2021-12-15 DIAGNOSIS — T45.1X5A CHEMOTHERAPY INDUCED NAUSEA AND VOMITING: ICD-10-CM

## 2021-12-15 PROCEDURE — 96360 HYDRATION IV INFUSION INIT: CPT

## 2021-12-15 PROCEDURE — 25010000002 HEPARIN LOCK FLUSH PER 10 UNITS: Performed by: INTERNAL MEDICINE

## 2021-12-15 RX ORDER — HEPARIN SODIUM (PORCINE) LOCK FLUSH IV SOLN 100 UNIT/ML 100 UNIT/ML
500 SOLUTION INTRAVENOUS AS NEEDED
Status: DISCONTINUED | OUTPATIENT
Start: 2021-12-15 | End: 2021-12-17 | Stop reason: HOSPADM

## 2021-12-15 RX ORDER — CEPHALEXIN 500 MG/1
500 CAPSULE ORAL 2 TIMES DAILY
Qty: 14 CAPSULE | Refills: 0 | Status: SHIPPED | OUTPATIENT
Start: 2021-12-15

## 2021-12-15 RX ORDER — HEPARIN SODIUM (PORCINE) LOCK FLUSH IV SOLN 100 UNIT/ML 100 UNIT/ML
500 SOLUTION INTRAVENOUS AS NEEDED
Status: CANCELLED | OUTPATIENT
Start: 2021-12-15

## 2021-12-15 RX ORDER — SODIUM CHLORIDE 0.9 % (FLUSH) 0.9 %
10 SYRINGE (ML) INJECTION AS NEEDED
Status: DISCONTINUED | OUTPATIENT
Start: 2021-12-15 | End: 2021-12-17 | Stop reason: HOSPADM

## 2021-12-15 RX ORDER — FENTANYL 25 UG/H
PATCH TRANSDERMAL
Qty: 10 PATCH | Refills: 0 | Status: SHIPPED | OUTPATIENT
Start: 2021-12-15

## 2021-12-15 RX ORDER — SODIUM CHLORIDE 0.9 % (FLUSH) 0.9 %
10 SYRINGE (ML) INJECTION AS NEEDED
Status: CANCELLED | OUTPATIENT
Start: 2021-12-15

## 2021-12-15 RX ORDER — FENTANYL 12 UG/H
PATCH TRANSDERMAL
Qty: 10 PATCH | Refills: 0 | Status: SHIPPED | OUTPATIENT
Start: 2021-12-15

## 2021-12-15 RX ADMIN — SODIUM CHLORIDE 1000 ML: 9 INJECTION, SOLUTION INTRAVENOUS at 09:35

## 2021-12-15 RX ADMIN — SODIUM CHLORIDE, PRESERVATIVE FREE 10 ML: 5 INJECTION INTRAVENOUS at 10:40

## 2021-12-15 RX ADMIN — HEPARIN 500 UNITS: 100 SYRINGE at 10:41

## 2021-12-16 ENCOUNTER — HOSPITAL ENCOUNTER (OUTPATIENT)
Dept: INFUSION THERAPY | Facility: HOSPITAL | Age: 51
Setting detail: INFUSION SERIES
Discharge: HOME | End: 2021-12-16

## 2021-12-16 VITALS — OXYGEN SATURATION: 99 % | RESPIRATION RATE: 18 BRPM | TEMPERATURE: 97.3 F

## 2021-12-16 DIAGNOSIS — R11.2 CHEMOTHERAPY INDUCED NAUSEA AND VOMITING: Primary | ICD-10-CM

## 2021-12-16 DIAGNOSIS — C22.1 CHOLANGIOCARCINOMA (HCC): ICD-10-CM

## 2021-12-16 DIAGNOSIS — T45.1X5A CHEMOTHERAPY INDUCED NAUSEA AND VOMITING: Primary | ICD-10-CM

## 2021-12-16 DIAGNOSIS — E86.0 DEHYDRATION: ICD-10-CM

## 2021-12-16 PROCEDURE — 96360 HYDRATION IV INFUSION INIT: CPT

## 2021-12-16 PROCEDURE — 25010000002 HEPARIN LOCK FLUSH PER 10 UNITS: Performed by: INTERNAL MEDICINE

## 2021-12-16 RX ORDER — SODIUM CHLORIDE 0.9 % (FLUSH) 0.9 %
10 SYRINGE (ML) INJECTION AS NEEDED
Status: DISCONTINUED | OUTPATIENT
Start: 2021-12-16 | End: 2021-12-18 | Stop reason: HOSPADM

## 2021-12-16 RX ORDER — HEPARIN SODIUM (PORCINE) LOCK FLUSH IV SOLN 100 UNIT/ML 100 UNIT/ML
500 SOLUTION INTRAVENOUS AS NEEDED
OUTPATIENT
Start: 2021-12-16

## 2021-12-16 RX ORDER — SODIUM CHLORIDE 0.9 % (FLUSH) 0.9 %
10 SYRINGE (ML) INJECTION AS NEEDED
OUTPATIENT
Start: 2021-12-16

## 2021-12-16 RX ORDER — HEPARIN SODIUM (PORCINE) LOCK FLUSH IV SOLN 100 UNIT/ML 100 UNIT/ML
500 SOLUTION INTRAVENOUS AS NEEDED
Status: DISCONTINUED | OUTPATIENT
Start: 2021-12-16 | End: 2021-12-18 | Stop reason: HOSPADM

## 2021-12-16 RX ADMIN — SODIUM CHLORIDE, PRESERVATIVE FREE 10 ML: 5 INJECTION INTRAVENOUS at 09:42

## 2021-12-16 RX ADMIN — HEPARIN 500 UNITS: 100 SYRINGE at 09:42

## 2021-12-16 RX ADMIN — SODIUM CHLORIDE 1000 ML: 9 INJECTION, SOLUTION INTRAVENOUS at 08:22

## 2021-12-20 ENCOUNTER — TELEPHONE (OUTPATIENT)
Dept: ONCOLOGY | Facility: CLINIC | Age: 51
End: 2021-12-20

## 2021-12-20 NOTE — TELEPHONE ENCOUNTER
Caller: KIANA    Relationship to patient: [employer]    Best call back number: [phone]    THE INS[employer]ANCE COMPANY NEEDS TO KNOW IF THE PT CAN PROPEL HERSELF IN THE WHEELCHAIR OR IF SHE NEEDS SOMEONE TO ASSIST HER.

## 2021-12-20 NOTE — TELEPHONE ENCOUNTER
Patient is having surgery today but I called and spoke with patients  and he states she can propel herself in a wheelchair.  This info was given to Corrina at [facility].

## 2022-01-05 ENCOUNTER — APPOINTMENT (OUTPATIENT)
Dept: CT IMAGING | Facility: HOSPITAL | Age: 52
End: 2022-01-05